# Patient Record
Sex: FEMALE | Race: BLACK OR AFRICAN AMERICAN | NOT HISPANIC OR LATINO | Employment: FULL TIME | URBAN - METROPOLITAN AREA
[De-identification: names, ages, dates, MRNs, and addresses within clinical notes are randomized per-mention and may not be internally consistent; named-entity substitution may affect disease eponyms.]

---

## 2019-08-13 PROBLEM — E55.9 VITAMIN D DEFICIENCY DISEASE: Status: ACTIVE | Noted: 2019-08-13

## 2019-08-13 PROBLEM — Z85.038 HISTORY OF COLON CANCER: Status: ACTIVE | Noted: 2018-06-18

## 2019-08-13 PROBLEM — M25.50 MULTIPLE JOINT PAIN: Status: ACTIVE | Noted: 2019-08-13

## 2019-08-13 PROBLEM — E53.8 FOLATE DEFICIENCY: Status: ACTIVE | Noted: 2018-06-21

## 2019-08-13 PROBLEM — R00.0 TACHYCARDIA: Status: ACTIVE | Noted: 2019-08-13

## 2019-08-13 PROBLEM — R53.83 FATIGUE: Status: ACTIVE | Noted: 2019-08-13

## 2020-11-12 ENCOUNTER — OFFICE VISIT (OUTPATIENT)
Dept: INTERNAL MEDICINE | Facility: CLINIC | Age: 55
End: 2020-11-12
Payer: COMMERCIAL

## 2020-11-12 ENCOUNTER — LAB VISIT (OUTPATIENT)
Dept: LAB | Facility: HOSPITAL | Age: 55
End: 2020-11-12
Payer: COMMERCIAL

## 2020-11-12 ENCOUNTER — HOSPITAL ENCOUNTER (OUTPATIENT)
Dept: RADIOLOGY | Facility: HOSPITAL | Age: 55
Discharge: HOME | End: 2020-11-12
Attending: FAMILY MEDICINE
Payer: COMMERCIAL

## 2020-11-12 VITALS
DIASTOLIC BLOOD PRESSURE: 80 MMHG | BODY MASS INDEX: 35.9 KG/M2 | TEMPERATURE: 100 F | SYSTOLIC BLOOD PRESSURE: 134 MMHG | HEART RATE: 68 BPM | OXYGEN SATURATION: 98 % | WEIGHT: 222.44 LBS

## 2020-11-12 DIAGNOSIS — M79.89 PAIN AND SWELLING OF LOWER LEG, LEFT: ICD-10-CM

## 2020-11-12 DIAGNOSIS — R53.82 CHRONIC FATIGUE: ICD-10-CM

## 2020-11-12 DIAGNOSIS — Z00.00 ROUTINE GENERAL MEDICAL EXAMINATION AT A HEALTH CARE FACILITY: ICD-10-CM

## 2020-11-12 DIAGNOSIS — M79.662 PAIN AND SWELLING OF LOWER LEG, LEFT: ICD-10-CM

## 2020-11-12 DIAGNOSIS — M25.50 PAIN IN JOINT, MULTIPLE SITES: ICD-10-CM

## 2020-11-12 DIAGNOSIS — Z12.31 ENCOUNTER FOR SCREENING MAMMOGRAM FOR MALIGNANT NEOPLASM OF BREAST: ICD-10-CM

## 2020-11-12 DIAGNOSIS — Z85.038 HISTORY OF COLON CANCER: ICD-10-CM

## 2020-11-12 DIAGNOSIS — M25.473 ANKLE SWELLING, UNSPECIFIED LATERALITY: ICD-10-CM

## 2020-11-12 DIAGNOSIS — Z00.00 ROUTINE GENERAL MEDICAL EXAMINATION AT A HEALTH CARE FACILITY: Primary | ICD-10-CM

## 2020-11-12 DIAGNOSIS — E66.01 SEVERE OBESITY (BMI 35.0-39.9) WITH COMORBIDITY: ICD-10-CM

## 2020-11-12 PROBLEM — E55.9 VITAMIN D DEFICIENCY DISEASE: Status: RESOLVED | Noted: 2019-08-13 | Resolved: 2020-11-12

## 2020-11-12 PROBLEM — R00.0 TACHYCARDIA: Status: RESOLVED | Noted: 2019-08-13 | Resolved: 2020-11-12

## 2020-11-12 LAB
BILIRUB UR QL STRIP: NEGATIVE
CLARITY UR: CLEAR
COLOR UR: YELLOW
GLUCOSE UR QL STRIP: NEGATIVE
HGB UR QL STRIP: ABNORMAL
KETONES UR QL STRIP: NEGATIVE
LEUKOCYTE ESTERASE UR QL STRIP: NEGATIVE
NITRITE UR QL STRIP: NEGATIVE
PH UR STRIP: 6 [PH] (ref 5–8)
PROT UR QL STRIP: NEGATIVE
SP GR UR STRIP: 1.02 (ref 1–1.03)
URN SPEC COLLECT METH UR: ABNORMAL

## 2020-11-12 PROCEDURE — 99999 PR PBB SHADOW E&M-NEW PATIENT-LVL III: ICD-10-PCS | Mod: PBBFAC,,, | Performed by: FAMILY MEDICINE

## 2020-11-12 PROCEDURE — 99213 PR OFFICE/OUTPT VISIT, EST, LEVL III, 20-29 MIN: ICD-10-PCS | Mod: 25,S$GLB,, | Performed by: FAMILY MEDICINE

## 2020-11-12 PROCEDURE — 3008F BODY MASS INDEX DOCD: CPT | Mod: CPTII,S$GLB,, | Performed by: FAMILY MEDICINE

## 2020-11-12 PROCEDURE — 3079F DIAST BP 80-89 MM HG: CPT | Mod: CPTII,S$GLB,, | Performed by: FAMILY MEDICINE

## 2020-11-12 PROCEDURE — 99396 PREV VISIT EST AGE 40-64: CPT | Mod: S$GLB,,, | Performed by: FAMILY MEDICINE

## 2020-11-12 PROCEDURE — 3075F SYST BP GE 130 - 139MM HG: CPT | Mod: CPTII,S$GLB,, | Performed by: FAMILY MEDICINE

## 2020-11-12 PROCEDURE — 81003 URINALYSIS AUTO W/O SCOPE: CPT

## 2020-11-12 PROCEDURE — 1126F PR PAIN SEVERITY QUANTIFIED, NO PAIN PRESENT: ICD-10-PCS | Mod: S$GLB,,, | Performed by: FAMILY MEDICINE

## 2020-11-12 PROCEDURE — 3075F PR MOST RECENT SYSTOLIC BLOOD PRESS GE 130-139MM HG: ICD-10-PCS | Mod: CPTII,S$GLB,, | Performed by: FAMILY MEDICINE

## 2020-11-12 PROCEDURE — 3008F PR BODY MASS INDEX (BMI) DOCUMENTED: ICD-10-PCS | Mod: CPTII,S$GLB,, | Performed by: FAMILY MEDICINE

## 2020-11-12 PROCEDURE — 99396 PR PREVENTIVE VISIT,EST,40-64: ICD-10-PCS | Mod: S$GLB,,, | Performed by: FAMILY MEDICINE

## 2020-11-12 PROCEDURE — 99213 OFFICE O/P EST LOW 20 MIN: CPT | Mod: 25,S$GLB,, | Performed by: FAMILY MEDICINE

## 2020-11-12 PROCEDURE — 73590 X-RAY EXAM OF LOWER LEG: CPT | Mod: TC,LT

## 2020-11-12 PROCEDURE — 3079F PR MOST RECENT DIASTOLIC BLOOD PRESSURE 80-89 MM HG: ICD-10-PCS | Mod: CPTII,S$GLB,, | Performed by: FAMILY MEDICINE

## 2020-11-12 PROCEDURE — 99999 PR PBB SHADOW E&M-NEW PATIENT-LVL III: CPT | Mod: PBBFAC,,, | Performed by: FAMILY MEDICINE

## 2020-11-12 PROCEDURE — 1126F AMNT PAIN NOTED NONE PRSNT: CPT | Mod: S$GLB,,, | Performed by: FAMILY MEDICINE

## 2020-11-12 PROCEDURE — 73590 XR TIBIA FIBULA 2 VIEW LEFT: ICD-10-PCS | Mod: 26,LT,, | Performed by: RADIOLOGY

## 2020-11-12 PROCEDURE — 73590 X-RAY EXAM OF LOWER LEG: CPT | Mod: 26,LT,, | Performed by: RADIOLOGY

## 2020-11-12 NOTE — PROGRESS NOTES
Subjective:       Patient ID: Sudhakar Singh is a 55 y.o. female.    Chief Complaint: Establish Care    55-year-old  female patient previously seen by Dr.Tasha Romano here to establish care.  Patient with Patient Active Problem List:     Folate deficiency     Acne vulgaris     History of colon cancer     Fatigue     Severe obesity (BMI 35.0-39.9) with comorbidity  Here for routine annual physicals.  Reports that she has not been taking blood pressure medication for more than a year, reports chronic fatigue  With history of colon cancer has been followed by Dr. Miranda , had colon surgery, chemo and radiation    Patient has not been exercising lately and reports that she has been having bilateral ankle swelling, recently at work had injury and reports having tenderness to the left lower leg just about the ankle for the past few days reports pain up to 5/10  Occasionally complains of minimal shortness of breath but not regularly  Patient also reports multiple joint pains which has been ongoing for past more than a year      Review of Systems   Constitutional: Positive for fatigue.   Eyes: Negative for visual disturbance.   Respiratory: Negative for shortness of breath.    Cardiovascular: Positive for leg swelling. Negative for chest pain.   Gastrointestinal: Negative for abdominal pain, blood in stool, constipation, nausea and vomiting.   Musculoskeletal: Positive for arthralgias, joint swelling and myalgias.   Skin: Negative for rash.   Neurological: Negative for weakness, light-headedness, numbness and headaches.   Psychiatric/Behavioral: Negative for sleep disturbance.         Review of Systems   Constitutional: Positive for fatigue.   Cardiovascular: Positive for leg swelling. Negative for chest pain.   Musculoskeletal: Positive for arthralgias, joint swelling and myalgias.   Skin: Negative for rash.   Neurological: Negative for weakness, light-headedness, numbness and headaches.     BP  134/80 (BP Location: Left arm, Patient Position: Sitting, BP Method: Large (Manual))   Pulse 68   Temp 99.5 °F (37.5 °C) (Tympanic)   Wt 100.9 kg (222 lb 7.1 oz)   SpO2 98%   BMI 35.90 kg/m²   Objective:      Physical Exam  Constitutional:       Appearance: She is well-developed.   HENT:      Head: Normocephalic and atraumatic.   Cardiovascular:      Rate and Rhythm: Normal rate and regular rhythm.      Heart sounds: Normal heart sounds. No murmur.   Pulmonary:      Effort: Pulmonary effort is normal.      Breath sounds: Normal breath sounds. No wheezing.   Abdominal:      General: Bowel sounds are normal.      Palpations: Abdomen is soft.      Tenderness: There is no abdominal tenderness.   Musculoskeletal:         General: Swelling and tenderness present.      Comments: Positive for bilateral ankle edema and swelling with a firm not noted to the left lower leg just about the left ankle   Skin:     General: Skin is warm and dry.      Findings: No rash.   Neurological:      Mental Status: She is alert and oriented to person, place, and time.   Psychiatric:         Mood and Affect: Mood normal.       Physical Exam  Constitutional:       Appearance: She is well-developed.   Cardiovascular:      Rate and Rhythm: Normal rate and regular rhythm.      Heart sounds: Normal heart sounds. No murmur.   Pulmonary:      Effort: Pulmonary effort is normal.      Breath sounds: Normal breath sounds. No wheezing.   Musculoskeletal:         General: Swelling and tenderness present.      Comments: Positive for bilateral ankle edema and swelling with a firm not noted to the left lower leg just about the left ankle   Skin:     General: Skin is warm and dry.      Findings: No rash.   Neurological:      Mental Status: She is alert and oriented to person, place, and time.       Assessment/Plan:   1. Routine general medical examination at a health care facility  - CBC Auto Differential; Future  - Comprehensive Metabolic Panel;  Future  - TSH; Future  - Lipid Panel; Future  - Hemoglobin A1C; Future  - Vitamin D; Future  - Urinalysis; Future  - HIV 1/2 Ag/Ab (4th Gen); Future  - Hepatitis C Antibody; Future  Vital signs stable today.  Clinical exam stable  Continue lifestyle modifications with low-fat and low-cholesterol diet and exercise 30 min daily  Flu shot given today    2. History of colon cancer  - CBC Auto Differential; Future  Followed by  Oncology Dr Fowler     3. Chronic fatigue  - TSH; Future  - Vitamin D; Future  Will check further labs but encouraged to eat protein rich diet    4. Encounter for screening mammogram for malignant neoplasm of breast  - Mammo Digital Screening Bilat w/ [facility]; Future  Due for mammogram    5. Severe obesity (BMI 35.0-39.9) with comorbidity  Strict lifestyle changes recommended with diet and exercise to lose weight with BMI 35    6. Pain and swelling of lower leg, left  - X-Ray Tibia Fibula 2 View Left; Future  Will get x-ray of the left lower leg to look into further etiology, secondary to recent injury    7. Ankle swelling, unspecified laterality  - BNP; Future  - MINA Screen w/Reflex; Future  Patient was advised to try compression stocking, will check further labs    8. Pain in joint, multiple sites  - MINA Screen w/Reflex; Future       Flu shot given today    Patient was advised to consider getting tetanus and shingles vaccination if interested outside pharmacy

## 2020-11-20 ENCOUNTER — HOSPITAL ENCOUNTER (OUTPATIENT)
Dept: RADIOLOGY | Facility: HOSPITAL | Age: 55
Discharge: HOME | End: 2020-11-20
Attending: FAMILY MEDICINE
Payer: COMMERCIAL

## 2020-11-20 VITALS — HEIGHT: 66 IN | WEIGHT: 222.44 LBS | BODY MASS INDEX: 35.75 KG/M2

## 2020-11-20 DIAGNOSIS — Z12.31 ENCOUNTER FOR SCREENING MAMMOGRAM FOR MALIGNANT NEOPLASM OF BREAST: ICD-10-CM

## 2020-11-20 PROCEDURE — 77067 MAMMO DIGITAL SCREENING BILAT WITH TOMO: ICD-10-PCS | Mod: 26,,, | Performed by: RADIOLOGY

## 2020-11-20 PROCEDURE — 77067 SCR MAMMO BI INCL CAD: CPT | Mod: TC

## 2020-11-20 PROCEDURE — 77063 BREAST TOMOSYNTHESIS BI: CPT | Mod: 26,,, | Performed by: RADIOLOGY

## 2020-11-20 PROCEDURE — 77067 SCR MAMMO BI INCL CAD: CPT | Mod: 26,,, | Performed by: RADIOLOGY

## 2020-11-20 PROCEDURE — 77063 MAMMO DIGITAL SCREENING BILAT WITH TOMO: ICD-10-PCS | Mod: 26,,, | Performed by: RADIOLOGY

## 2021-09-30 ENCOUNTER — OFFICE VISIT (OUTPATIENT)
Dept: FAMILY MEDICINE | Facility: CLINIC | Age: 56
End: 2021-09-30
Payer: COMMERCIAL

## 2021-09-30 VITALS
TEMPERATURE: 98 F | HEIGHT: 66 IN | OXYGEN SATURATION: 98 % | BODY MASS INDEX: 35.96 KG/M2 | SYSTOLIC BLOOD PRESSURE: 110 MMHG | HEART RATE: 70 BPM | RESPIRATION RATE: 18 BRPM | WEIGHT: 223.75 LBS | DIASTOLIC BLOOD PRESSURE: 80 MMHG

## 2021-09-30 DIAGNOSIS — M77.11 LATERAL EPICONDYLITIS OF RIGHT ELBOW: Primary | ICD-10-CM

## 2021-09-30 PROBLEM — R53.83 FATIGUE: Status: RESOLVED | Noted: 2019-08-13 | Resolved: 2021-09-30

## 2021-09-30 PROBLEM — C18.9 PRIMARY MALIGNANT NEOPLASM OF COLON: Status: ACTIVE | Noted: 2021-03-19

## 2021-09-30 PROBLEM — E78.5 HYPERLIPIDEMIA: Status: ACTIVE | Noted: 2021-03-19

## 2021-09-30 PROBLEM — I10 HYPERTENSION: Status: ACTIVE | Noted: 2021-03-19

## 2021-09-30 PROCEDURE — 3079F DIAST BP 80-89 MM HG: CPT | Mod: CPTII,S$GLB,, | Performed by: REGISTERED NURSE

## 2021-09-30 PROCEDURE — 99999 PR PBB SHADOW E&M-EST. PATIENT-LVL III: ICD-10-PCS | Mod: PBBFAC,,, | Performed by: REGISTERED NURSE

## 2021-09-30 PROCEDURE — 3008F BODY MASS INDEX DOCD: CPT | Mod: CPTII,S$GLB,, | Performed by: REGISTERED NURSE

## 2021-09-30 PROCEDURE — 1159F PR MEDICATION LIST DOCUMENTED IN MEDICAL RECORD: ICD-10-PCS | Mod: CPTII,S$GLB,, | Performed by: REGISTERED NURSE

## 2021-09-30 PROCEDURE — 1159F MED LIST DOCD IN RCRD: CPT | Mod: CPTII,S$GLB,, | Performed by: REGISTERED NURSE

## 2021-09-30 PROCEDURE — 3079F PR MOST RECENT DIASTOLIC BLOOD PRESSURE 80-89 MM HG: ICD-10-PCS | Mod: CPTII,S$GLB,, | Performed by: REGISTERED NURSE

## 2021-09-30 PROCEDURE — 99213 PR OFFICE/OUTPT VISIT, EST, LEVL III, 20-29 MIN: ICD-10-PCS | Mod: S$GLB,,, | Performed by: REGISTERED NURSE

## 2021-09-30 PROCEDURE — 3074F SYST BP LT 130 MM HG: CPT | Mod: CPTII,S$GLB,, | Performed by: REGISTERED NURSE

## 2021-09-30 PROCEDURE — 99213 OFFICE O/P EST LOW 20 MIN: CPT | Mod: S$GLB,,, | Performed by: REGISTERED NURSE

## 2021-09-30 PROCEDURE — 3008F PR BODY MASS INDEX (BMI) DOCUMENTED: ICD-10-PCS | Mod: CPTII,S$GLB,, | Performed by: REGISTERED NURSE

## 2021-09-30 PROCEDURE — 3074F PR MOST RECENT SYSTOLIC BLOOD PRESSURE < 130 MM HG: ICD-10-PCS | Mod: CPTII,S$GLB,, | Performed by: REGISTERED NURSE

## 2021-09-30 PROCEDURE — 99999 PR PBB SHADOW E&M-EST. PATIENT-LVL III: CPT | Mod: PBBFAC,,, | Performed by: REGISTERED NURSE

## 2021-09-30 RX ORDER — NAPROXEN 500 MG/1
500 TABLET ORAL 2 TIMES DAILY PRN
Qty: 14 TABLET | Refills: 0 | Status: SHIPPED | OUTPATIENT
Start: 2021-09-30

## 2021-11-02 ENCOUNTER — HOSPITAL ENCOUNTER (EMERGENCY)
Facility: HOSPITAL | Age: 56
Discharge: HOME | End: 2021-11-02
Attending: EMERGENCY MEDICINE
Payer: COMMERCIAL

## 2021-11-02 VITALS
SYSTOLIC BLOOD PRESSURE: 142 MMHG | TEMPERATURE: 99 F | HEIGHT: 66 IN | BODY MASS INDEX: 36.32 KG/M2 | RESPIRATION RATE: 16 BRPM | WEIGHT: 226 LBS | OXYGEN SATURATION: 99 % | DIASTOLIC BLOOD PRESSURE: 81 MMHG | HEART RATE: 65 BPM

## 2021-11-02 DIAGNOSIS — R10.12 LEFT UPPER QUADRANT ABDOMINAL PAIN: Primary | ICD-10-CM

## 2021-11-02 DIAGNOSIS — M54.50 ACUTE LEFT-SIDED LOW BACK PAIN WITHOUT SCIATICA: ICD-10-CM

## 2021-11-02 LAB
ALBUMIN SERPL BCP-MCNC: 3.6 G/DL (ref 3.5–5.2)
ALP SERPL-CCNC: 54 U/L (ref 55–135)
ALT SERPL W/O P-5'-P-CCNC: 14 U/L (ref 10–44)
ANION GAP SERPL CALC-SCNC: 9 MMOL/L (ref 8–16)
AST SERPL-CCNC: 22 U/L (ref 10–40)
BASOPHILS # BLD AUTO: 0.05 K/UL (ref 0–0.2)
BASOPHILS NFR BLD: 1 % (ref 0–1.9)
BILIRUB SERPL-MCNC: 0.5 MG/DL (ref 0.1–1)
BILIRUB UR QL STRIP: NEGATIVE
BUN SERPL-MCNC: 9 MG/DL (ref 6–20)
CALCIUM SERPL-MCNC: 9.2 MG/DL (ref 8.7–10.5)
CHLORIDE SERPL-SCNC: 106 MMOL/L (ref 95–110)
CLARITY UR: CLEAR
CO2 SERPL-SCNC: 23 MMOL/L (ref 23–29)
COLOR UR: YELLOW
CREAT SERPL-MCNC: 0.9 MG/DL (ref 0.5–1.4)
DIFFERENTIAL METHOD: ABNORMAL
EOSINOPHIL # BLD AUTO: 0.1 K/UL (ref 0–0.5)
EOSINOPHIL NFR BLD: 2.1 % (ref 0–8)
ERYTHROCYTE [DISTWIDTH] IN BLOOD BY AUTOMATED COUNT: 12.5 % (ref 11.5–14.5)
EST. GFR  (AFRICAN AMERICAN): >60 ML/MIN/1.73 M^2
EST. GFR  (NON AFRICAN AMERICAN): >60 ML/MIN/1.73 M^2
GLUCOSE SERPL-MCNC: 91 MG/DL (ref 70–110)
GLUCOSE UR QL STRIP: NEGATIVE
HCT VFR BLD AUTO: 36.6 % (ref 37–48.5)
HGB BLD-MCNC: 11.4 G/DL (ref 12–16)
HGB UR QL STRIP: ABNORMAL
IMM GRANULOCYTES # BLD AUTO: 0 K/UL (ref 0–0.04)
IMM GRANULOCYTES NFR BLD AUTO: 0 % (ref 0–0.5)
KETONES UR QL STRIP: NEGATIVE
LEUKOCYTE ESTERASE UR QL STRIP: NEGATIVE
LIPASE SERPL-CCNC: 5 U/L (ref 4–60)
LYMPHOCYTES # BLD AUTO: 2.5 K/UL (ref 1–4.8)
LYMPHOCYTES NFR BLD: 46.8 % (ref 18–48)
MCH RBC QN AUTO: 27.9 PG (ref 27–31)
MCHC RBC AUTO-ENTMCNC: 31.1 G/DL (ref 32–36)
MCV RBC AUTO: 90 FL (ref 82–98)
MONOCYTES # BLD AUTO: 0.4 K/UL (ref 0.3–1)
MONOCYTES NFR BLD: 7.6 % (ref 4–15)
NEUTROPHILS # BLD AUTO: 2.2 K/UL (ref 1.8–7.7)
NEUTROPHILS NFR BLD: 42.5 % (ref 38–73)
NITRITE UR QL STRIP: NEGATIVE
NRBC BLD-RTO: 0 /100 WBC
PH UR STRIP: 6 [PH] (ref 5–8)
PLATELET # BLD AUTO: 264 K/UL (ref 150–450)
PMV BLD AUTO: 10.6 FL (ref 9.2–12.9)
POTASSIUM SERPL-SCNC: 4.1 MMOL/L (ref 3.5–5.1)
PROT SERPL-MCNC: 7.5 G/DL (ref 6–8.4)
PROT UR QL STRIP: NEGATIVE
RBC # BLD AUTO: 4.09 M/UL (ref 4–5.4)
SODIUM SERPL-SCNC: 138 MMOL/L (ref 136–145)
SP GR UR STRIP: 1.02 (ref 1–1.03)
URN SPEC COLLECT METH UR: ABNORMAL
UROBILINOGEN UR STRIP-ACNC: NEGATIVE EU/DL
WBC # BLD AUTO: 5.23 K/UL (ref 3.9–12.7)

## 2021-11-02 PROCEDURE — 85025 COMPLETE CBC W/AUTO DIFF WBC: CPT | Performed by: NURSE PRACTITIONER

## 2021-11-02 PROCEDURE — 80053 COMPREHEN METABOLIC PANEL: CPT | Performed by: NURSE PRACTITIONER

## 2021-11-02 PROCEDURE — 99283 EMERGENCY DEPT VISIT LOW MDM: CPT | Mod: 25

## 2021-11-02 PROCEDURE — 81003 URINALYSIS AUTO W/O SCOPE: CPT | Performed by: NURSE PRACTITIONER

## 2021-11-02 PROCEDURE — 83690 ASSAY OF LIPASE: CPT | Performed by: NURSE PRACTITIONER

## 2021-11-02 RX ORDER — TRAMADOL HYDROCHLORIDE 50 MG/1
50 TABLET ORAL EVERY 6 HOURS PRN
Qty: 12 TABLET | Refills: 0 | Status: SHIPPED | OUTPATIENT
Start: 2021-11-02

## 2022-04-01 ENCOUNTER — NURSE TRIAGE (OUTPATIENT)
Dept: ADMINISTRATIVE | Facility: CLINIC | Age: 57
End: 2022-04-01
Payer: COMMERCIAL

## 2022-04-01 NOTE — TELEPHONE ENCOUNTER
Reason for Disposition   Patient wants to be seen    Additional Information   Negative: Sounds like a life-threatening emergency to the triager   Negative: Pregnant > 20 weeks or postpartum (< 6 weeks after delivery) and new hand or face swelling   Negative: Pregnant > 20 weeks and BP > 140/90   Negative: Systolic BP >= 160 OR Diastolic >= 100, and any cardiac or neurologic symptoms (e.g., chest pain, difficulty breathing, unsteady gait, blurred vision)   Negative: Patient sounds very sick or weak to the triager   Negative: BP Systolic BP >= 140 OR Diastolic >= 90 and postpartum (from 0 to 6 weeks after delivery)   Negative: Systolic BP >= 180 OR Diastolic >= 110, and missed most recent dose of blood pressure medication   Negative: Systolic BP >= 180 OR Diastolic >= 110    Protocols used: HIGH BLOOD PRESSURE-A-OH    Pt stated her bp has been high. Stated BP last night was 155/94 and BP today is 157/98. Stated she does not take BP meds. Pt showed up to the wrong location and missed her appointment this morning. Pt advised to see a MD in the office today. Instruced to go to Urgent care or the ED for evaluation. Advised to call OOC back if new or worsening symptoms develop. Pt verbalized understanding. Warm transferred to Joi in scheduling.

## 2022-08-11 ENCOUNTER — PATIENT OUTREACH (OUTPATIENT)
Dept: ADMINISTRATIVE | Facility: HOSPITAL | Age: 57
End: 2022-08-11
Payer: COMMERCIAL

## 2022-08-11 ENCOUNTER — TELEPHONE (OUTPATIENT)
Dept: INTERNAL MEDICINE | Facility: CLINIC | Age: 57
End: 2022-08-11
Payer: COMMERCIAL

## 2022-08-11 VITALS — SYSTOLIC BLOOD PRESSURE: 117 MMHG | DIASTOLIC BLOOD PRESSURE: 71 MMHG

## 2022-08-11 NOTE — PROGRESS NOTES
Working HTN report:     Called to discuss scheduling appointment and to get updated BP reading. Remote BP given 117/71, entered. Annual scheduled 10/12/2022

## 2022-08-24 DIAGNOSIS — Z12.31 OTHER SCREENING MAMMOGRAM: ICD-10-CM

## 2023-01-09 ENCOUNTER — OFFICE VISIT (OUTPATIENT)
Dept: OTOLARYNGOLOGY | Facility: CLINIC | Age: 58
End: 2023-01-09
Payer: COMMERCIAL

## 2023-01-09 VITALS — WEIGHT: 232.56 LBS | BODY MASS INDEX: 37.54 KG/M2 | TEMPERATURE: 98 F

## 2023-01-09 DIAGNOSIS — R42 DIZZINESS: Primary | ICD-10-CM

## 2023-01-09 PROCEDURE — 3008F BODY MASS INDEX DOCD: CPT | Mod: CPTII,S$GLB,, | Performed by: PHYSICIAN ASSISTANT

## 2023-01-09 PROCEDURE — 4010F ACE/ARB THERAPY RXD/TAKEN: CPT | Mod: CPTII,S$GLB,, | Performed by: PHYSICIAN ASSISTANT

## 2023-01-09 PROCEDURE — 4010F PR ACE/ARB THEARPY RXD/TAKEN: ICD-10-PCS | Mod: CPTII,S$GLB,, | Performed by: PHYSICIAN ASSISTANT

## 2023-01-09 PROCEDURE — 99203 PR OFFICE/OUTPT VISIT, NEW, LEVL III, 30-44 MIN: ICD-10-PCS | Mod: S$GLB,,, | Performed by: PHYSICIAN ASSISTANT

## 2023-01-09 PROCEDURE — 1159F PR MEDICATION LIST DOCUMENTED IN MEDICAL RECORD: ICD-10-PCS | Mod: CPTII,S$GLB,, | Performed by: PHYSICIAN ASSISTANT

## 2023-01-09 PROCEDURE — 99999 PR PBB SHADOW E&M-EST. PATIENT-LVL III: CPT | Mod: PBBFAC,,, | Performed by: PHYSICIAN ASSISTANT

## 2023-01-09 PROCEDURE — 99203 OFFICE O/P NEW LOW 30 MIN: CPT | Mod: S$GLB,,, | Performed by: PHYSICIAN ASSISTANT

## 2023-01-09 PROCEDURE — 3008F PR BODY MASS INDEX (BMI) DOCUMENTED: ICD-10-PCS | Mod: CPTII,S$GLB,, | Performed by: PHYSICIAN ASSISTANT

## 2023-01-09 PROCEDURE — 99999 PR PBB SHADOW E&M-EST. PATIENT-LVL III: ICD-10-PCS | Mod: PBBFAC,,, | Performed by: PHYSICIAN ASSISTANT

## 2023-01-09 PROCEDURE — 1159F MED LIST DOCD IN RCRD: CPT | Mod: CPTII,S$GLB,, | Performed by: PHYSICIAN ASSISTANT

## 2023-01-09 RX ORDER — HYDROCHLOROTHIAZIDE 12.5 MG/1
1 CAPSULE ORAL EVERY MORNING
COMMUNITY
End: 2023-10-30 | Stop reason: SDUPTHER

## 2023-01-09 RX ORDER — HYDROCODONE BITARTRATE AND ACETAMINOPHEN 5; 325 MG/1; MG/1
1 TABLET ORAL EVERY 6 HOURS PRN
COMMUNITY
Start: 2022-08-22 | End: 2023-10-30 | Stop reason: ALTCHOICE

## 2023-01-09 RX ORDER — LISINOPRIL 20 MG/1
20 TABLET ORAL
COMMUNITY
Start: 2022-11-29 | End: 2023-10-30 | Stop reason: SDUPTHER

## 2023-01-09 RX ORDER — ROSUVASTATIN CALCIUM 5 MG/1
5 TABLET, COATED ORAL
COMMUNITY
Start: 2022-12-13 | End: 2023-11-07 | Stop reason: SDUPTHER

## 2023-01-09 RX ORDER — CLONAZEPAM 0.5 MG/1
0.5 TABLET ORAL NIGHTLY
COMMUNITY
Start: 2022-08-22 | End: 2023-10-30 | Stop reason: ALTCHOICE

## 2023-01-09 RX ORDER — SERTRALINE HYDROCHLORIDE 25 MG/1
25 TABLET, FILM COATED ORAL
COMMUNITY
Start: 2022-08-22 | End: 2023-10-30 | Stop reason: ALTCHOICE

## 2023-01-09 RX ORDER — ONDANSETRON 4 MG/1
TABLET, ORALLY DISINTEGRATING ORAL
COMMUNITY
Start: 2022-08-09 | End: 2023-10-30 | Stop reason: ALTCHOICE

## 2023-01-09 RX ORDER — MECLIZINE HYDROCHLORIDE 25 MG/1
25 TABLET ORAL
COMMUNITY
Start: 2023-01-07 | End: 2023-10-30 | Stop reason: SDUPTHER

## 2023-01-09 NOTE — PROGRESS NOTES
Subjective:   Patient ID: Sudhakar Singh is a 57 y.o. female.    Chief Complaint: Other (ER follow up Vertigo )    Patient is a very pleasant 57 y.o. female here to see me today for the first time for evaluation of dizziness.   She reports that the symptoms have been present for the last 3 days.  On average, the patent reports symptoms that occur approximately 1 time each months.  She describes the dizziness as whirling, a sensation of unsteadiness, and a sensation of movement of surroundings and says that it lasts days.  She has noted that nothing acts as a trigger.  She denies aural pressure, otalgia, and hearing loss. She does have ringing in both ears, which is new.  She has not started any new medications, and has not had any recent dietary changes. She did take meclizine given from E yesterday. H/o subarachnoid hemorrhage in 8/22. She has associated N/V.     Review of patient's allergies indicates:  No Known Allergies        Review of Systems   Constitutional:  Negative for chills, fatigue, fever and unexpected weight change.   HENT:  Negative for congestion, dental problem, ear discharge, ear pain, facial swelling, hearing loss, nosebleeds, postnasal drip, rhinorrhea, sinus pressure, sneezing, sore throat, tinnitus, trouble swallowing and voice change.    Eyes:  Negative for redness, itching and visual disturbance.   Respiratory:  Negative for cough, choking, shortness of breath and wheezing.    Cardiovascular:  Negative for chest pain and palpitations.   Gastrointestinal:  Positive for nausea and vomiting. Negative for abdominal pain.        No reflux.   Musculoskeletal:  Negative for gait problem.   Skin:  Negative for rash.   Neurological:  Positive for dizziness. Negative for light-headedness and headaches.       Objective:   Temp 97.6 °F (36.4 °C) (Temporal)   Wt 105.5 kg (232 lb 9.4 oz)   BMI 37.54 kg/m²     Physical Exam  Constitutional:       General: She is not in acute distress.      Appearance: She is well-developed.   HENT:      Head: Normocephalic and atraumatic.      Right Ear: Tympanic membrane, ear canal and external ear normal.      Left Ear: Tympanic membrane, ear canal and external ear normal.      Nose: Nose normal. No nasal deformity, septal deviation, mucosal edema or rhinorrhea.      Right Sinus: No maxillary sinus tenderness or frontal sinus tenderness.      Left Sinus: No maxillary sinus tenderness or frontal sinus tenderness.      Mouth/Throat:      Mouth: Mucous membranes are not pale and not dry.      Dentition: No dental caries.      Pharynx: Uvula midline. No oropharyngeal exudate or posterior oropharyngeal erythema.   Eyes:      General: Lids are normal. No scleral icterus.     Extraocular Movements:      Right eye: Normal extraocular motion and no nystagmus.      Left eye: Normal extraocular motion and no nystagmus.      Conjunctiva/sclera: Conjunctivae normal.      Right eye: Right conjunctiva is not injected. No chemosis.     Left eye: Left conjunctiva is not injected. No chemosis.     Pupils: Pupils are equal, round, and reactive to light.   Neck:      Thyroid: No thyroid mass or thyromegaly.      Trachea: Trachea and phonation normal. No tracheal tenderness or tracheal deviation.   Pulmonary:      Effort: Pulmonary effort is normal. No respiratory distress.      Breath sounds: No stridor.   Abdominal:      General: There is no distension.   Lymphadenopathy:      Head:      Right side of head: No submental, submandibular, preauricular, posterior auricular or occipital adenopathy.      Left side of head: No submental, submandibular, preauricular, posterior auricular or occipital adenopathy.      Cervical: No cervical adenopathy.   Skin:     General: Skin is warm and dry.      Findings: No erythema or rash.   Neurological:      Mental Status: She is alert and oriented to person, place, and time.      Cranial Nerves: No cranial nerve deficit.   Psychiatric:          Behavior: Behavior normal.            Assessment:     1. Dizziness        Plan:     Dizziness    I had a long discussion with the patient regarding their symptoms.  Dizziness, vertigo and disequilibrium are common symptoms reported by adults during visits to their doctors. They are all symptoms that can result from a peripheral vestibular disorder (a dysfunction of the balance organs of the inner ear) or central vestibular disorder (a dysfunction of one or more parts of the central nervous system that help process balance and spatial information).  There are also non-vestibular causes of dizziness, and dizziness can be linked to a wide array of problems such as blood-flow irregularities from cardiovascular problems and blood pressure fluctuations.  I would recommend a VNG with audiogram for further diagnostic testing, and will contact the patient with further recommendations when that is complete.

## 2023-01-13 ENCOUNTER — CLINICAL SUPPORT (OUTPATIENT)
Dept: AUDIOLOGY | Facility: CLINIC | Age: 58
End: 2023-01-13
Payer: COMMERCIAL

## 2023-01-13 DIAGNOSIS — H81.8X9 OTHER DISORDERS OF VESTIBULAR FUNCTION, UNSPECIFIED EAR: Primary | ICD-10-CM

## 2023-01-13 PROCEDURE — 92540 PR VESTIBULAR EVAL NYSTAG FOVL&PERPH STIM OSCIL TRACKING: ICD-10-PCS | Mod: S$GLB,,, | Performed by: AUDIOLOGIST-HEARING AID FITTER

## 2023-01-13 PROCEDURE — 92557 PR COMPREHENSIVE HEARING TEST: ICD-10-PCS | Mod: S$GLB,,, | Performed by: AUDIOLOGIST-HEARING AID FITTER

## 2023-01-13 PROCEDURE — 92557 COMPREHENSIVE HEARING TEST: CPT | Mod: S$GLB,,, | Performed by: AUDIOLOGIST-HEARING AID FITTER

## 2023-01-13 PROCEDURE — 99999 PR PBB SHADOW E&M-EST. PATIENT-LVL I: ICD-10-PCS | Mod: PBBFAC,,, | Performed by: AUDIOLOGIST-HEARING AID FITTER

## 2023-01-13 PROCEDURE — 92540 BASIC VESTIBULAR EVALUATION: CPT | Mod: S$GLB,,, | Performed by: AUDIOLOGIST-HEARING AID FITTER

## 2023-01-13 PROCEDURE — 92567 TYMPANOMETRY: CPT | Mod: S$GLB,,, | Performed by: AUDIOLOGIST-HEARING AID FITTER

## 2023-01-13 PROCEDURE — 99999 PR PBB SHADOW E&M-EST. PATIENT-LVL I: CPT | Mod: PBBFAC,,, | Performed by: AUDIOLOGIST-HEARING AID FITTER

## 2023-01-13 PROCEDURE — 92567 PR TYMPA2METRY: ICD-10-PCS | Mod: S$GLB,,, | Performed by: AUDIOLOGIST-HEARING AID FITTER

## 2023-01-13 NOTE — PROGRESS NOTES
Referring provider: HENOK Lozoya Francisco was seen 01/13/2023 for an audiological and vestibular evaluation.  Patient complains of dizziness described as unsteadiness, swaying or rocking. Mostly when standing or walking but may occur when supine. At times, room may spin lasting for days. Symptoms persist off and on throughout the day. No specific provoking factors except for when arising from bed. The dizziness is worse when she arises after sleeping in bed, and is better when she arises after sleeping in her recliner. Patient saw a vestibular therapist at the ED on 01/06/23 and says she got very dizzy with positioning maneuvers. She denies hearing loss or changes in hearing. She will at times get brief moments of tinnitus, not new. She reports onset of migraine after diagnosis of subarachnoid hemorrhage in 8/22.     Audiology Report:  Results reveal normal hearing 250-6000 Hz sloping to a mild indeterminant hearing loss at 8000 Hz for the right ear, and normal hearing loss 250-8000 Hz for the left ear.   Speech Reception Thresholds were 15 dBHL for the right ear and 15 dBHL for the left ear.   Word recognition scores were excellent for the right ear and excellent for the left ear.   Tympanograms were Type A for the right ear and Type A for the left ear.    Videonystagmography Report (VNG):  Oculomotor function tests:  Sinusoidal tracking revealed reduced gain and catch-up saccade at all test frequencies.  Saccade revealed normal latencies, velocities and accuracies.  Optokinetic was abnormal with reduced (poor) function for rightward targest at 30 d/s. Patient had a difficult time maintaining eyes open.   Gaze test was absent for nystagmus.  Spontaneous test was absent for nystagmus.  Head-shake test was absent for after head-shake nystagmus.  Static Positional test was absent for nystagmus.  [place]-Hallpike Right revealed 7 d/s upbeat nystagmus with questionable presence of right-beat  nystagmus. Slight dizziness was reported.   [place]-Hallpike Left was questionable for BPPV. Patient would close eyes (dizziness) but it appeared as though there was torsional nystagmus. There was definite upbeat nystagmus, unknown SPV. Calorics could not be tested because patient ate a meal prior to exam.     Summary: VNG calorics could not be completed today because patient ate meal prior to exam. Abnormal function to sinusoidal pursuit and OPK are central oculo-motor findings. [place]-Hallpike was questionable for BPPV in at least the left ear versus non-localizing positional nystagmus. A Left Epley maneuver was completed today. No nystagmus or dizziness was elicited during Epley (goggles removed). Patient will return in one week for positioning recheck and calorics.     A 5-position Epley maneuver was performed for the left ear.  Patient tolerated the maneuver well and was asymptomatic upon discharge.  Post maneuver instructions were given to the patient both verbally and written.  Understanding was voiced.     Recommendations:  Head restrictions following Epley for one week.  Return in one week to recheck positionals and complete caloric test as needed.     Patient was counseled on the above findings.  Tracings are to be scanned.

## 2023-01-20 ENCOUNTER — CLINICAL SUPPORT (OUTPATIENT)
Dept: AUDIOLOGY | Facility: CLINIC | Age: 58
End: 2023-01-20
Payer: COMMERCIAL

## 2023-01-20 DIAGNOSIS — H81.4 VERTIGO OF CENTRAL ORIGIN, UNSPECIFIED LATERALITY: ICD-10-CM

## 2023-01-20 DIAGNOSIS — H81.92 PERIPHERAL VESTIBULOPATHY, LEFT: Primary | ICD-10-CM

## 2023-01-20 PROCEDURE — 92537 PR CALORIC VSTBLR TEST W/REC BITHERMAL: ICD-10-PCS | Mod: S$GLB,,, | Performed by: AUDIOLOGIST-HEARING AID FITTER

## 2023-01-20 PROCEDURE — 92537 CALORIC VSTBLR TEST W/REC: CPT | Mod: S$GLB,,, | Performed by: AUDIOLOGIST-HEARING AID FITTER

## 2023-01-20 PROCEDURE — 92542 POSITIONAL NYSTAGMUS TEST: CPT | Mod: S$GLB,,, | Performed by: AUDIOLOGIST-HEARING AID FITTER

## 2023-01-20 PROCEDURE — 92542 PR POSITIONAL NYSTAGMUS TEST: ICD-10-PCS | Mod: S$GLB,,, | Performed by: AUDIOLOGIST-HEARING AID FITTER

## 2023-01-20 NOTE — PROGRESS NOTES
"Referring provider: HENOK Lozoya Francisco was seen 2023 for completion of Videonystagmography (VNG) testing.    At her previous VNG visit on 23, results revealed abnormal sinusoidal pursuit and OPK. [place]-Hallpike was questionable for BPPV in at least the left ear versus non-localizing positional nystagmus. A left Epley maneuver was completed. Patient reports dizzy symptoms are slightly better.     Spontaneous nystagmus was absent.  [place]-Hallpike Left was negative for BPPV. There was questionable presence of mild up-beating nystagmus (non-measurable SPV, by observation only). Dizziness described as "head swirling" was reported.   [place]-Hallpike Right was negative for BPPV. There was 3 d/s RB positional nystagmus. Dizziness described as "head swirling" was reported.   Static Positional nystagmus was absent.  Bi-thermal caloric irrigations revealed a 35% caloric weakness in the left ear, which is not within normal limits, and 10% directional preponderance to the right, which is within normal limits.  Fixation suppression following caloric irrigations were normal.  RC: 14 d/s  RW: 13 d/s   d/s  LW: 4 d/s    Impressions: Abnormal VNG indicative of central and left peripheral vestibular dysfunction.   Abnormal findings were as follows:  35% left caloric weakness  Abnormal central oculo-motor sinusoidal pursuit and OPK    Recommendations:  1. ENT review  2. Formal VRT. Patient has not been able to resume work due to her dizziness.     Tracings are scanned into computer.     "

## 2023-01-25 DIAGNOSIS — R42 DIZZINESS: Primary | ICD-10-CM

## 2023-01-31 ENCOUNTER — CLINICAL SUPPORT (OUTPATIENT)
Dept: REHABILITATION | Facility: HOSPITAL | Age: 58
End: 2023-01-31
Payer: COMMERCIAL

## 2023-01-31 DIAGNOSIS — R42 DIZZINESS: ICD-10-CM

## 2023-01-31 PROCEDURE — 97112 NEUROMUSCULAR REEDUCATION: CPT | Mod: PN

## 2023-01-31 PROCEDURE — 97161 PT EVAL LOW COMPLEX 20 MIN: CPT | Mod: PN

## 2023-01-31 NOTE — PLAN OF CARE
[facility]   Physical Therapy Initial Evaluation     Date: 1/31/2023   Name: Sudhakar Singh  Clinic Number: [id number]    Therapy Diagnosis:   Encounter Diagnosis   Name Primary?    Dizziness      Physician: Gricelda Henderson,*    Physician Orders: PT Eval and Treat   Medical Diagnosis from Referral: Dizziness  Evaluation Date: 1/31/2023  Authorization Period Expiration: 12/31/2023  Plan of Care Expiration: 4/6/2023  Progress Note Due: 10th  Visit # / Visits authorized: 1/ 12   FOTO: 1/ 3     Precautions: Standard    Time In: 10:15  Time Out: 11:10  Total Appointment Time (timed & untimed codes): 55 minutes    SUBJECTIVE   Date of onset: 10 years     History of current condition - Sudhakar reports: 10 year history of dizziness  that typically last 3 days at a time. On average, the patent reports symptoms that occur approximately 1 time each month. She describes the dizziness as whirling, a sensation of unsteadiness, and a sensation of movement of surroundings and says that it lasts days. She typically leans or feels as she is falling toward the left.  At her previous VNG visit on 01/13/23, results revealed abnormal sinusoidal pursuit and OPK. [place]-Hallpike was questionable for BPPV in at least the left ear versus non-localizing positional nystagmus. A left Epley maneuver was completed. Patient reports dizzy symptoms are better since that visit.  Hx of colon cancer 7 years ago  H/o subarachnoid hemorrhage in 8/22    Falls: frequent LOB toward left but no falls    Imaging, MRI studies, VNG: Impressions: Abnormal VNG indicative of central and left peripheral vestibular dysfunction.   Abnormal findings were as follows:  35% left caloric weakness  Abnormal central oculo-motor sinusoidal pursuit and OPKH/o     MRI: subarachnoid hemorrhage in 8/22     Prior Therapy: yes  Social History:  lives alone  Occupation: currently on leave from [employer]  Prior Level of Function: mod I  Current  "Level of Function: mod I    Dizziness:  Current 1/10, worst 10/10, best 0/10   Location: left head  head  Description: whirling and dizzy  Aggravating Factors: Bending, Lifting, and Getting out of bed/chair  Easing Factors: relaxation    Patients goals: improve balance and start walking program     Medical History:   Past Medical History:   Diagnosis Date    Colon cancer     Dr Carpio    Hypertension 3/19/2021       Surgical History:   Sudhakar Singh  has a past surgical history that includes Hysterectomy and Colon surgery.    Medications:   Sudhakar has a current medication list which includes the following prescription(s): arm brace, clonazepam, hydrochlorothiazide, hydrocodone-acetaminophen, lisinopril, meclizine, naproxen, ondansetron, rosuvastatin, and sertraline.    Allergies:   Review of patient's allergies indicates:  No Known Allergies       OBJECTIVE         CMS Impairment/Limitation/Restriction for FOTO Vertigo Survey    Therapist reviewed FOTO scores for Sudhakar Singh on 1/31/2023.   FOTO documents entered into EPIC - see Media section.    Limitation Score: 53%  DFS: 42.7/67        Vestibular Testing  Visual Fields: normal  Horizontal tracking: slightly impaired with interruptions noted  Vertical Tracking: decreased speed  Diagonal tracking:  decreased speed and pt reports 3/10 dizziness post   Lateral Visual acuity: NT  Dynamic Visual Acuity: NT  Vestibular Occular Reflex   Vertical: normal   Horizontal: normal  Convergence: NT  [place] Halpike:  neg for nystagmus, pt did report whirling sensation and discomfort during supine to sit transition   Saccades: normal with 3/10 s/s reported     Vestibular:     Head impulse:  neg Nystagmus   Head shake:  neg nystagmus   Saud Hallpike  R neg, L neg   Roll test R neg, L neg, but uncomfortable "whirling" sensation    Balance testing:  Double limb standing eyes open/ eyes closed: 30"min sway/20" mod sway  NBOS: head nod- 3/10 dizziness    Head shake-3/10 " "  Eyes closed with head motion resulted in immediate LOB toward left  Floor tap: pt extremely guarded and apprehensive with bending forward or head down position  360 degree turn: unsteady and fearful toward left with greater than 5" to complete   normal less than 3" completion toward right   Side-lying to sit: increased dizziness report toward left     Fall prevention:   Increased fall risk with posterior, left, and forward pertubation with impaired ankle and hip strategy    TREATMENT     Total Treatment time (time-based codes) separate from Evaluation: 25 minutes      Sudhakar received the treatments listed below:    Seated and standing VOR, smooth pursuit, and habituation     PATIENT EDUCATION AND HOME EXERCISES     Education provided:   - yes, safety precautions for home and vestibular training     Written Home Exercises Provided: yes. Exercises were reviewed and Sudhakar was able to demonstrate them prior to the end of the session.  Sudhakar demonstrated good  understanding of the education provided. See EMR under Patient Instructions for exercises provided during therapy sessions.    ASSESSMENT     Sudhakar is a 57 y.o. female referred to outpatient Physical Therapy with a medical diagnosis of dizziness, pt presents with signs and symptoms consistent with diagnosis along with impaired dynamic balance. The patient presents with impairments which include weakness, impaired endurance, impaired functional mobility, gait instability, impaired balance, decreased safety awareness, decreased ROM, and VOR hypofunction .  These impairments are limiting patient's ability to complete ADLs, household chores, work related tasks, and community ambulation and levels.     Pt prognosis is Excellent due to personal factors and co-morbidities listed below.   Pt will benefit from skilled outpatient Physical Therapy to address the deficits stated above and in the chart below, provide pt/family education, and to maximize pt's level of " independence.     Plan of care discussed with patient: Yes  Pt's spiritual, cultural and educational needs considered and patient is agreeable to the plan of care and goals as stated below:     Anticipated Barriers for therapy: none    Medical Necessity is demonstrated by the following  History  Co-morbidities and personal factors that may impact the plan of care Co-morbidities:   See medical hx  Personal Factors:   no deficits     low   Examination  Body Structures and Functions, activity limitations and participation restrictions that may impact the plan of care Body Regions:   head    Body Systems:    strength  balance  gait  joint mobility, muscle tone, muscle length    Participation Restrictions:   See current level of function listed above     Activity limitations:   Learning and applying knowledge  no deficits    General Tasks and Commands  undertaking multiple tasks    Communication  no deficits    Mobility  walking    Self care  washing oneself (bathing, drying, washing hands)    Domestic Life  shopping  cooking  doing house work (cleaning house, washing dishes, laundry)    Interactions/Relationships  no deficits    Life Areas  employment    Community and Social Life  community life  recreation and leisure         low   Clinical Presentation stable and uncomplicated low   Decision Making/ Complexity Score: low     Goals: Reviewed:1/31/2023    STG'S: 6 weeks  1. Patient independent in HEP of balance, habituation, and gaze stabilization.  Exercises to be done Daily.  2. Decrease patient's subjective rating of dizziness to a 1 on 0-10 scale) with bed mobility and transfers.   3.  Decrease patient's subjective rating of dizziness to a  2 after bending forward to  item.     LTG'S : 10 weeks  1. Patient able to ambulate in community safely without assistive device, on varied terrainwith head movement, without LOB or c/o dizziness.  DGI improvement by 1-3 points.  2. Patient's subjective rating of dizziness  will decreased to 0-2 on 0-10 scale  3. Patient able to perform lifting, squatting, and light weight carrying without LOB or c/o dizziness  4.  Pt to ambulate outside on unlevel terrain without veering or feeling unsteadiness  5. Pt to be I with self management of condition and progression vestibular program for maintenance.    PLAN     Plan of care Certification: 1/31/2023 to 4/8/2023.    Outpatient Physical Therapy 2 times weekly for 10 weeks to include the following interventions: Electrical Stimulation prn, Gait Training, Manual Therapy, Moist Heat/ Ice, Neuromuscular Re-ed, Orthotic Management and Training, Patient Education, Self Care, Therapeutic Activities, Therapeutic Exercise, and canalith repositioning .     Karyna Antonio, PT      I CERTIFY THE NEED FOR THESE SERVICES FURNISHED UNDER THIS PLAN OF TREATMENT AND WHILE UNDER MY CARE   Physician's comments:     Physician's Signature: ___________________________________________________

## 2023-02-07 ENCOUNTER — TELEPHONE (OUTPATIENT)
Dept: OTOLARYNGOLOGY | Facility: CLINIC | Age: 58
End: 2023-02-07
Payer: COMMERCIAL

## 2023-02-07 NOTE — TELEPHONE ENCOUNTER
----- Message from Mer Khalil sent at 2/7/2023 11:26 AM CST -----  Pt would like the nurse to call her back regarding her last appt. Call back number is .[phone]. Thx. EL

## 2023-02-08 ENCOUNTER — CLINICAL SUPPORT (OUTPATIENT)
Dept: REHABILITATION | Facility: HOSPITAL | Age: 58
End: 2023-02-08
Payer: COMMERCIAL

## 2023-02-08 DIAGNOSIS — R42 DIZZINESS: Primary | ICD-10-CM

## 2023-02-08 PROCEDURE — 97112 NEUROMUSCULAR REEDUCATION: CPT | Mod: PN

## 2023-02-08 PROCEDURE — 97530 THERAPEUTIC ACTIVITIES: CPT | Mod: PN

## 2023-02-08 PROCEDURE — 97116 GAIT TRAINING THERAPY: CPT | Mod: PN

## 2023-02-08 NOTE — PROGRESS NOTES
"[facility]   Physical Therapy Treatment Note     Name: Sudhakar Singh  Clinic Number: [id number]    Therapy Diagnosis:   Encounter Diagnosis   Name Primary?    Dizziness Yes     Physician: Gricelda Henderson,*    Visit Date: 2/8/2023    Physician Orders: PT Eval and Treat   Medical Diagnosis from Referral: Dizziness  Evaluation Date: 1/31/2023  Authorization Period Expiration: 12/31/2023  Plan of Care Expiration: 4/6/2023  Progress Note Due: 10th  Visit # / Visits authorized: 2/ 12   FOTO: 1/ 3     PTA Visit #: 0/5     Time In: 9:00  Time Out: 9:55  Total Billable Time: 55 minutes    SUBJECTIVE     Pt reports: dizziness with large positional changes.  She was compliant with home exercise program.  Response to previous treatment: positive   Functional change: improved supine to sit t/f    Dizziness: 3/10  Location: bilateral head      OBJECTIVE     Objective Measures updated at progress report unless specified.     Treatment     Sudhakar received the treatments listed below:        neuromuscular re-education activities to improve: Balance, Coordination, Kinesthetic, Sense, Proprioception, Posture, and canalith repositioning for 40 minutes. The following activities were included:  VOR x1 training standing 30" 2x h/v max cues  Vertical head motion eyes open 10 reps CGA  Vertical head motion eyes closed 3 reps with LOB   Horiz head motion eyes open 10 reps CGA  Horiz head motion eyes closed 3 reps with LOB   STS from standard with arms 2x10 close range target and far with vast busy background  Reverse stepping 2 laps  Tandem walking  multi trials max of 3  DGI: 16/24  mCTSIB: 30", 30" increased sway, 30" mod sway, 16" lean to right  Head thrust: positive on right  DVA: 1 line loss  Saud Hallpike: upward rotating nyst Right side  Log Roll: no nystagmus noted, s/s with left    Epley Maneuver ([id number])  Indication: Dizziness, Positive [place]Hallpike Maneuver  Technique: After the procedure was " "explained at length to the patient and verbal consent was obtained, the patient's head was turned to the right while sitting upright. The patient was then brought to the supine position with head turned to the right and slightly extended, and remained in that position for 1 minute. The patient's head was then turned all the way to the left for 1 minute. At that point the patient was turned to left lateral decubitus position for 1 minute. The patient was then brought back to the upright seated position. The patient tolerated the procedure well, and was instructed to remain upright for the next 48 hours.       therapeutic activities to improve functional performance for 10  minutes, including:  Stair negotiation with recip pattern BUE use  Walking over 4" obstacle  Walking around 4" obstacles fwd and figure 8 pattern: increased LOB and unsteadiness with turning       gait training to improve functional mobility and safety for 10  minutes, including:  Amb with quick head turns  Amb with prolonged head turn  Amb with depth perception seeking  Amb with target seeking           Patient Education and Home Exercises     Home Exercises Provided and Patient Education Provided     Education provided:   - yes, VRT    Written Home Exercises Provided: yes. Exercises were reviewed and Sudhakar was able to demonstrate them prior to the end of the session.  Sudhakar demonstrated good  understanding of the education provided. See EMR under Patient Instructions for exercises provided during therapy sessions    ASSESSMENT     Pt tolerated session well. Pt presents with multi system deficits including right unilateral hypofunction noted with head thrust, upward rotating right nystagmus indicative of right posterior canal involvement, as well as saccadic interruption during smooth pursuit due to recent brain bleed. Her greatest deficits are noted with unlevel mandy and eyes closed as her vestibular system is impaired. Pt participated in " adaptation, habituation, and substitution during static and dynamic balance training.  She verbalized understanding to HEP and demonstrated with cues. Pt would benefit from cont VRT to reduce fall risk and improve functional independence.      Sudhakar Is progressing well towards her goals.   Pt prognosis is Good.     Pt will continue to benefit from skilled outpatient physical therapy to address the deficits listed in the problem list box on initial evaluation, provide pt/family education and to maximize pt's level of independence in the home and community environment.     Pt's spiritual, cultural and educational needs considered and pt agreeable to plan of care and goals.     Anticipated barriers to physical therapy: none    Goals:   STG'S: 6 weeks  1. Patient independent in HEP of balance, habituation, and gaze stabilization.  Exercises to be done Daily.  2. Decrease patient's subjective rating of dizziness to a 1 on 0-10 scale) with bed mobility and transfers.   3.  Decrease patient's subjective rating of dizziness to a  2 after bending forward to  item.      LTG'S  : 10 weeks  1. Patient able to ambulate in community safely without assistive device, on varied terrainwith head movement, without LOB or c/o dizziness.  DGI improvement by 1-3 points.  2. Patient's subjective rating of dizziness will decreased to 0-2 on 0-10 scale  3. Patient able to perform lifting, squatting, and light weight carrying without LOB or c/o dizziness  4.  Pt to ambulate outside on unlevel terrain without veering or feeling unsteadiness  5. Pt to be I with self management of condition and progression vestibular program for maintenance.     PLAN      Plan of care Certification: 1/31/2023 to 4/8/2023.     Outpatient Physical Therapy 2 times weekly for 10 weeks to include the following interventions: Electrical Stimulation prn, Gait Training, Manual Therapy, Moist Heat/ Ice, Neuromuscular Re-ed, Orthotic Management and Training,  Patient Education, Self Care, Therapeutic Activities, Therapeutic Exercise, and canalith repositioning .     Karyna Antonio, PT

## 2023-02-17 ENCOUNTER — CLINICAL SUPPORT (OUTPATIENT)
Dept: REHABILITATION | Facility: HOSPITAL | Age: 58
End: 2023-02-17
Payer: COMMERCIAL

## 2023-02-17 ENCOUNTER — TELEPHONE (OUTPATIENT)
Dept: OTOLARYNGOLOGY | Facility: CLINIC | Age: 58
End: 2023-02-17
Payer: COMMERCIAL

## 2023-02-17 DIAGNOSIS — R42 DIZZINESS: Primary | ICD-10-CM

## 2023-02-17 PROCEDURE — 97530 THERAPEUTIC ACTIVITIES: CPT | Mod: PN

## 2023-02-17 PROCEDURE — 97116 GAIT TRAINING THERAPY: CPT | Mod: PN

## 2023-02-17 PROCEDURE — 97112 NEUROMUSCULAR REEDUCATION: CPT | Mod: PN

## 2023-02-17 NOTE — PROGRESS NOTES
"[facility]   Physical Therapy Treatment Note     Name: Sudhakar Singh  Clinic Number: [id number]    Therapy Diagnosis:   Encounter Diagnosis   Name Primary?    Dizziness Yes     Physician: Gricelda Henderson,*    Visit Date: 2/17/2023    Physician Orders: PT Eval and Treat   Medical Diagnosis from Referral: Dizziness  Evaluation Date: 1/31/2023  Authorization Period Expiration: 12/31/2023  Plan of Care Expiration: 4/6/2023  Progress Note Due: 10th  Visit # / Visits authorized: 3/ 12   FOTO: 1/ 3     PTA Visit #: 0/5     Time In: 10:15  Time Out: 11:05  Total Billable Time: 50 minutes    SUBJECTIVE     Pt reports: felt a little unsteady but no dizziness  She was compliant with home exercise program.  Response to previous treatment: positive   Functional change: improved supine to sit t/f    Dizziness: 1/10  Location: bilateral head      OBJECTIVE     Objective Measures updated at progress report unless specified.     DGI: 16/24  Treatment     Sudhakar received the treatments listed below:        neuromuscular re-education activities to improve: Balance, Coordination, Kinesthetic, Sense, Proprioception, Posture, and canalith repositioning for 25 minutes. The following activities were included:  VOR x1 training standing 30" 2x h/v flat surface and foam   Vertical head motion eyes open 10 reps flat and foam surface  Vertical head motion eyes closed 1 LOB self corrected flat surface  Horiz head motion eyes open 10 reps CGA  Horiz head motion eyes closed 3 reps with LOB   Reverse stepping 2 laps with head turns  mCTSIB: 30", 30" increased sway, 30" mod sway, 16" lean to right  Head thrust: positive on right  DVA: 1 line loss  [place] Hallpike: upward rotating nyst Right side  Log Roll: no nystagmus noted, s/s with left          therapeutic activities to improve functional performance for 15 minutes, including:  D2 and D1 pattern holding 4# ball 1x10 ea side   Holding light weight ball " "reaching under chair and placing ball on OH shelf 1x10  STS from 20" box holding 10# ball with OH press 1x10  Floor taps with gaze stab to target 1x10 (2 LOB)      gait training to improve functional mobility and safety for 10  minutes, including:  Amb with quick head turns  Amb with prolonged head turn  Amb with depth perception seeking  Amb with target seeking       Patient Education and Home Exercises     Home Exercises Provided and Patient Education Provided     Education provided:   - yes, VRT    Written Home Exercises Provided: yes. Exercises were reviewed and Sudhakar was able to demonstrate them prior to the end of the session.  Sudhakar demonstrated good  understanding of the education provided. See EMR under Patient Instructions for exercises provided during therapy sessions    ASSESSMENT     Pt tolerated session well. She demonstrated improved balance with head rotation during ambulation and foam mandy. Advanced to work related tasks including squatting and lifting. Pt was unsteady and fatigued quickly but overall no dizziness reported. Increased difficulty noted along with posterior LOB with floor taps only.Pt to cont with current HEP with the addition of floor taps with target seeking at home. Pt would benefit from cont VRT to reduce fall risk and improve functional independence.      Sudhakar Is progressing well towards her goals.   Pt prognosis is Good.     Pt will continue to benefit from skilled outpatient physical therapy to address the deficits listed in the problem list box on initial evaluation, provide pt/family education and to maximize pt's level of independence in the home and community environment.     Pt's spiritual, cultural and educational needs considered and pt agreeable to plan of care and goals.     Anticipated barriers to physical therapy: none    Goals:   STG'S: 6 weeks  1. Patient independent in HEP of balance, habituation, and gaze stabilization.  Exercises to be done Daily.  2. " Decrease patient's subjective rating of dizziness to a 1 on 0-10 scale) with bed mobility and transfers.   3.  Decrease patient's subjective rating of dizziness to a  2 after bending forward to  item.      LTG'S  : 10 weeks  1. Patient able to ambulate in community safely without assistive device, on varied terrainwith head movement, without LOB or c/o dizziness.  DGI improvement by 1-3 points.  2. Patient's subjective rating of dizziness will decreased to 0-2 on 0-10 scale  3. Patient able to perform lifting, squatting, and light weight carrying without LOB or c/o dizziness  4.  Pt to ambulate outside on unlevel terrain without veering or feeling unsteadiness  5. Pt to be I with self management of condition and progression vestibular program for maintenance.     PLAN      Plan of care Certification: 1/31/2023 to 4/8/2023.     Outpatient Physical Therapy 2 times weekly for 10 weeks to include the following interventions: Electrical Stimulation prn, Gait Training, Manual Therapy, Moist Heat/ Ice, Neuromuscular Re-ed, Orthotic Management and Training, Patient Education, Self Care, Therapeutic Activities, Therapeutic Exercise, and canalith repositioning .     Karyna Antonio, PT

## 2023-02-17 NOTE — TELEPHONE ENCOUNTER
I sent a message to Karyna Antonio (T) to get job description and whether modifications are possible.  Asked that she reply to Karen and she can address when she's back in clinic next week.    ----- Message from Scooby Zheng LPN sent at 2/16/2023  4:47 PM CST -----  Contact: Sudhakar Ross!  This pt is requesting an extended leave of absence from work . She was originally under PCP care and off but then ended up with dizziness and now in therapy and would like to stay off of work until the 12 weeks is up. Please advise  ----- Message -----  From: Yi Watters  Sent: 2/16/2023  12:17 PM CST  To: Beatriz Madison Staff    Sudhakar is needing  a call back regarding paperwork. Please call her back at [phone].

## 2023-02-22 ENCOUNTER — TELEPHONE (OUTPATIENT)
Dept: OTOLARYNGOLOGY | Facility: CLINIC | Age: 58
End: 2023-02-22
Payer: COMMERCIAL

## 2023-02-22 NOTE — TELEPHONE ENCOUNTER
----- Message from Mango Perez sent at 2/22/2023 11:51 AM CST -----  Pt is calling in regards to the office needing to consult with her      It is in regards to insurance      Pls call her back at [phone]    Thank you    Nany

## 2023-02-23 ENCOUNTER — PATIENT MESSAGE (OUTPATIENT)
Dept: OTOLARYNGOLOGY | Facility: CLINIC | Age: 58
End: 2023-02-23
Payer: COMMERCIAL

## 2023-02-28 ENCOUNTER — TELEPHONE (OUTPATIENT)
Dept: OTOLARYNGOLOGY | Facility: CLINIC | Age: 58
End: 2023-02-28
Payer: COMMERCIAL

## 2023-02-28 NOTE — TELEPHONE ENCOUNTER
----- Message from Chintan Bledsoe sent at 2/28/2023  2:23 PM CST -----  Contact: Jamin manning  Type: Needs Medical Advice    Who Called:Jamin manning Disability Claim  Best Call Back Number:[phone] 9-5 central time  Additional Information Requesting a call back regarding Jamin manning was calling to speak with office in regards to some form provider recently filled for pt disability office stated they have some questions  please call back when available Thank you  Please Advise-Thank you

## 2023-03-01 ENCOUNTER — TELEPHONE (OUTPATIENT)
Dept: OTOLARYNGOLOGY | Facility: CLINIC | Age: 58
End: 2023-03-01
Payer: COMMERCIAL

## 2023-03-01 NOTE — TELEPHONE ENCOUNTER
Spoke with Kaitlin for [employer] claims. Stated she needed more info on work restrictions. Message sent to Karyna Romeo and Karen for advice.

## 2023-03-01 NOTE — TELEPHONE ENCOUNTER
----- Message from Susie Capellan sent at 3/1/2023  1:20 PM CST -----  Contact: [employer] depluca Madrigal is working on a claim that was submitted to their office and has some questions in regards to claims. Please call her back at [phone].          Thanks  DD

## 2023-03-03 ENCOUNTER — CLINICAL SUPPORT (OUTPATIENT)
Dept: REHABILITATION | Facility: HOSPITAL | Age: 58
End: 2023-03-03
Payer: COMMERCIAL

## 2023-03-03 DIAGNOSIS — R42 DIZZINESS: Primary | ICD-10-CM

## 2023-03-03 PROCEDURE — 97112 NEUROMUSCULAR REEDUCATION: CPT | Mod: PN

## 2023-03-03 PROCEDURE — 97116 GAIT TRAINING THERAPY: CPT | Mod: PN

## 2023-03-03 PROCEDURE — 97530 THERAPEUTIC ACTIVITIES: CPT | Mod: PN

## 2023-03-03 NOTE — PLAN OF CARE
[facility]  PT Discharge Note    Name: Sudhakar Singh  Clinic Number: [id number]    Therapy Diagnosis:   Encounter Diagnosis   Name Primary?    Dizziness Yes     Physician: Gricelda Henderson,*    Physician Orders: PT Eval and Treat   Medical Diagnosis from Referral: Dizziness  Evaluation Date: 1/31/2023    Date of Last visit: 3/3/2023  Total Visits Received: 4    ASSESSMENT      Pt reports feeling much better with no episodes of dizziness in the past two weeks. She demonstrated improved balance with head rotation during ambulation and foam mandy. Advanced to work related tasks including repetitive squatting and lifting. Multiple steps utilized to mimic work related scenarios with slower speed required for steadiness but overall pt did well with no LOB or dizziness.     Discharge reason: Patient has met all of his/her goals    Discharge FOTO Score: 30% limitation (improved from 52%)    Goals: Goals:   STG'S: 6 weeks  1. Patient independent in HEP of balance, habituation, and gaze stabilization.  Exercises to be done Daily. MET  2. Decrease patient's subjective rating of dizziness to a 1 on 0-10 scale) with bed mobility and transfers. MET  3.  Decrease patient's subjective rating of dizziness to a  2 after bending forward to  item. MET     LTG'S  : 10 weeks  1. Patient able to ambulate in community safely without assistive device, on varied terrainwith head movement, without LOB or c/o dizziness.  DGI improvement by 1-3 points. MET  2. Patient's subjective rating of dizziness will decreased to 0-2 on 0-10 scale. MET  3. Patient able to perform lifting, squatting, and light weight carrying without LOB or c/o dizziness. MET  4.  Pt to ambulate outside on unlevel terrain without veering or feeling unsteadiness. MET  5. Pt to be I with self management of condition and progression vestibular program for maintenance. MET    PLAN   This patient is discharged from Physical  Therapy      Karyna Antonio, PT

## 2023-03-03 NOTE — PROGRESS NOTES
"[facility]   Physical Therapy Treatment Note     Name: Sudhakar Singh  Clinic Number: [id number]    Therapy Diagnosis:   Encounter Diagnosis   Name Primary?    Dizziness Yes     Physician: Gricelda Henderson,*    Visit Date: 3/3/2023    Physician Orders: PT Eval and Treat   Medical Diagnosis from Referral: Dizziness  Evaluation Date: 1/31/2023  Authorization Period Expiration: 12/31/2023  Plan of Care Expiration: 4/6/2023  Progress Note Due: 10th  Visit # / Visits authorized: 3/ 12   FOTO: 1/ 3     PTA Visit #: 0/5     Time In: 10:15  Time Out: 11:05  Total Billable Time: 50 minutes    SUBJECTIVE     Pt reports: felt a little unsteady but no dizziness  She was compliant with home exercise program.  Response to previous treatment: positive   Functional change: improved supine to sit t/f    Dizziness: 1/10  Location: bilateral head      OBJECTIVE     Objective Measures updated at progress report unless specified.     DGI: 16/24  Treatment     Sudhakar received the treatments listed below:        neuromuscular re-education activities to improve: Balance, Coordination, Kinesthetic, Sense, Proprioception, Posture, and canalith repositioning for 15 minutes. The following activities were included:  VOR x1 training standing 30" 2x h/v flat surface and foam   Vertical head motion eyes open 10 reps flat and foam surface  Vertical head motion eyes closed 1 LOB self corrected flat surface  Horiz head motion eyes open 10 reps CGA  Horiz head motion eyes closed 3 reps with LOB          therapeutic activities to improve functional performance for 15 minutes, including:  D2 and D1 pattern holding 4# ball 1x10 ea side   Holding light weight ball reaching under chair and placing ball on OH shelf 1x10  STS from 20" box holding 10# ball with OH press 1x10  12 in to 18 in step with horiz and vertical head turns with pivot and step down to mimic work related tasks     gait training to improve functional " mobility and safety for 10  minutes, including:  Amb with quick head turns  Amb with prolonged head turn  Amb with depth perception seeking  Amb with target seeking       Patient Education and Home Exercises     Home Exercises Provided and Patient Education Provided     Education provided:   - yes, VRT    Written Home Exercises Provided: yes. Exercises were reviewed and Sudhakar was able to demonstrate them prior to the end of the session.  Sudhakar demonstrated good  understanding of the education provided. See EMR under Patient Instructions for exercises provided during therapy sessions    ASSESSMENT     Pt reports feeling much better with no episodes of dizziness in the past two weeks. She demonstrated improved balance with head rotation during ambulation and foam mandy. Advanced to work related tasks including repetitive squatting and lifting. Multiple steps utilized to mimic work related scenarios with slower speed required for steadiness but overall pt did well with no LOB or dizziness. At this time I see no evidence of BPPV and pt is ready to return to work. LPN for MD's office informed via [employer].     Sudhakar Is progressing well towards her goals.   Pt prognosis is Good.     Pt will continue to benefit from skilled outpatient physical therapy to address the deficits listed in the problem list box on initial evaluation, provide pt/family education and to maximize pt's level of independence in the home and community environment.     Pt's spiritual, cultural and educational needs considered and pt agreeable to plan of care and goals.     Anticipated barriers to physical therapy: none    Goals:   STG'S: 6 weeks  1. Patient independent in HEP of balance, habituation, and gaze stabilization.  Exercises to be done Daily. MET  2. Decrease patient's subjective rating of dizziness to a 1 on 0-10 scale) with bed mobility and transfers. MET  3.  Decrease patient's subjective rating of dizziness to a  2 after bending  forward to  item. MET     LTG'S  : 10 weeks  1. Patient able to ambulate in community safely without assistive device, on varied terrainwith head movement, without LOB or c/o dizziness.  DGI improvement by 1-3 points. MET  2. Patient's subjective rating of dizziness will decreased to 0-2 on 0-10 scale. MET  3. Patient able to perform lifting, squatting, and light weight carrying without LOB or c/o dizziness. MET  4.  Pt to ambulate outside on unlevel terrain without veering or feeling unsteadiness. MET  5. Pt to be I with self management of condition and progression vestibular program for maintenance. MET     PLAN      Plan of care Certification: 1/31/2023 to 4/8/2023.     Outpatient Physical Therapy 2 times weekly for 10 weeks to include the following interventions: Electrical Stimulation prn, Gait Training, Manual Therapy, Moist Heat/ Ice, Neuromuscular Re-ed, Orthotic Management and Training, Patient Education, Self Care, Therapeutic Activities, Therapeutic Exercise, and canalith repositioning .     Karyna Antonio, PT

## 2023-10-13 ENCOUNTER — TELEPHONE (OUTPATIENT)
Dept: PRIMARY CARE CLINIC | Facility: CLINIC | Age: 58
End: 2023-10-13
Payer: MEDICAID

## 2023-10-30 ENCOUNTER — OFFICE VISIT (OUTPATIENT)
Dept: PRIMARY CARE CLINIC | Facility: CLINIC | Age: 58
End: 2023-10-30
Payer: MEDICAID

## 2023-10-30 ENCOUNTER — PATIENT OUTREACH (OUTPATIENT)
Dept: ADMINISTRATIVE | Facility: OTHER | Age: 58
End: 2023-10-30
Payer: MEDICAID

## 2023-10-30 ENCOUNTER — HOSPITAL ENCOUNTER (OUTPATIENT)
Dept: CARDIOLOGY | Facility: HOSPITAL | Age: 58
Discharge: HOME | End: 2023-10-30
Attending: FAMILY MEDICINE
Payer: MEDICAID

## 2023-10-30 VITALS
WEIGHT: 237 LBS | BODY MASS INDEX: 38.09 KG/M2 | HEART RATE: 63 BPM | HEIGHT: 66 IN | DIASTOLIC BLOOD PRESSURE: 90 MMHG | SYSTOLIC BLOOD PRESSURE: 124 MMHG | OXYGEN SATURATION: 99 % | TEMPERATURE: 99 F

## 2023-10-30 DIAGNOSIS — Z85.038 HISTORY OF COLON CANCER: ICD-10-CM

## 2023-10-30 DIAGNOSIS — I10 ESSENTIAL HYPERTENSION: ICD-10-CM

## 2023-10-30 DIAGNOSIS — Z12.31 BREAST CANCER SCREENING BY MAMMOGRAM: ICD-10-CM

## 2023-10-30 DIAGNOSIS — Z11.3 SCREEN FOR STD (SEXUALLY TRANSMITTED DISEASE): ICD-10-CM

## 2023-10-30 DIAGNOSIS — E78.2 MIXED HYPERLIPIDEMIA: ICD-10-CM

## 2023-10-30 DIAGNOSIS — N30.00 ACUTE CYSTITIS WITHOUT HEMATURIA: ICD-10-CM

## 2023-10-30 DIAGNOSIS — R42 DIZZINESS AND GIDDINESS: Primary | ICD-10-CM

## 2023-10-30 DIAGNOSIS — R42 VERTIGO: ICD-10-CM

## 2023-10-30 LAB
BILIRUB UR QL STRIP: NEGATIVE
CLARITY UR REFRACT.AUTO: CLEAR
COLOR UR AUTO: YELLOW
GLUCOSE UR QL STRIP: NEGATIVE
HGB UR QL STRIP: NEGATIVE
KETONES UR QL STRIP: NEGATIVE
LEUKOCYTE ESTERASE UR QL STRIP: ABNORMAL
MICROSCOPIC COMMENT: NORMAL
NITRITE UR QL STRIP: NEGATIVE
PH UR STRIP: 6 [PH] (ref 5–8)
PROT UR QL STRIP: NEGATIVE
RBC #/AREA URNS AUTO: 3 /HPF (ref 0–4)
SP GR UR STRIP: 1.02 (ref 1–1.03)
SQUAMOUS #/AREA URNS AUTO: 5 /HPF
URN SPEC COLLECT METH UR: ABNORMAL
WBC #/AREA URNS AUTO: 3 /HPF (ref 0–5)

## 2023-10-30 PROCEDURE — 99215 PR OFFICE/OUTPT VISIT, EST, LEVL V, 40-54 MIN: ICD-10-PCS | Mod: S$PBB,,, | Performed by: FAMILY MEDICINE

## 2023-10-30 PROCEDURE — 99417 PR PROLONGED SVC, OUTPT, W/WO DIRECT PT CONTACT,  EA ADDTL 15 MIN: ICD-10-PCS | Mod: S$PBB,,, | Performed by: FAMILY MEDICINE

## 2023-10-30 PROCEDURE — 99417 PROLNG OP E/M EACH 15 MIN: CPT | Mod: S$PBB,,, | Performed by: FAMILY MEDICINE

## 2023-10-30 PROCEDURE — 1159F MED LIST DOCD IN RCRD: CPT | Mod: CPTII,,, | Performed by: FAMILY MEDICINE

## 2023-10-30 PROCEDURE — 99214 OFFICE O/P EST MOD 30 MIN: CPT | Mod: PBBFAC,PN | Performed by: FAMILY MEDICINE

## 2023-10-30 PROCEDURE — 3008F BODY MASS INDEX DOCD: CPT | Mod: CPTII,,, | Performed by: FAMILY MEDICINE

## 2023-10-30 PROCEDURE — 99999 PR PBB SHADOW E&M-EST. PATIENT-LVL IV: CPT | Mod: PBBFAC,,, | Performed by: FAMILY MEDICINE

## 2023-10-30 PROCEDURE — 4010F ACE/ARB THERAPY RXD/TAKEN: CPT | Mod: CPTII,,, | Performed by: FAMILY MEDICINE

## 2023-10-30 PROCEDURE — 4010F PR ACE/ARB THEARPY RXD/TAKEN: ICD-10-PCS | Mod: CPTII,,, | Performed by: FAMILY MEDICINE

## 2023-10-30 PROCEDURE — 3074F PR MOST RECENT SYSTOLIC BLOOD PRESSURE < 130 MM HG: ICD-10-PCS | Mod: CPTII,,, | Performed by: FAMILY MEDICINE

## 2023-10-30 PROCEDURE — 3008F PR BODY MASS INDEX (BMI) DOCUMENTED: ICD-10-PCS | Mod: CPTII,,, | Performed by: FAMILY MEDICINE

## 2023-10-30 PROCEDURE — 3044F HG A1C LEVEL LT 7.0%: CPT | Mod: CPTII,,, | Performed by: FAMILY MEDICINE

## 2023-10-30 PROCEDURE — 3080F DIAST BP >= 90 MM HG: CPT | Mod: CPTII,,, | Performed by: FAMILY MEDICINE

## 2023-10-30 PROCEDURE — 1159F PR MEDICATION LIST DOCUMENTED IN MEDICAL RECORD: ICD-10-PCS | Mod: CPTII,,, | Performed by: FAMILY MEDICINE

## 2023-10-30 PROCEDURE — 3044F PR MOST RECENT HEMOGLOBIN A1C LEVEL <7.0%: ICD-10-PCS | Mod: CPTII,,, | Performed by: FAMILY MEDICINE

## 2023-10-30 PROCEDURE — 3074F SYST BP LT 130 MM HG: CPT | Mod: CPTII,,, | Performed by: FAMILY MEDICINE

## 2023-10-30 PROCEDURE — 99215 OFFICE O/P EST HI 40 MIN: CPT | Mod: S$PBB,,, | Performed by: FAMILY MEDICINE

## 2023-10-30 PROCEDURE — 81001 URINALYSIS AUTO W/SCOPE: CPT | Performed by: FAMILY MEDICINE

## 2023-10-30 PROCEDURE — 99999 PR PBB SHADOW E&M-EST. PATIENT-LVL IV: ICD-10-PCS | Mod: PBBFAC,,, | Performed by: FAMILY MEDICINE

## 2023-10-30 PROCEDURE — 3080F PR MOST RECENT DIASTOLIC BLOOD PRESSURE >= 90 MM HG: ICD-10-PCS | Mod: CPTII,,, | Performed by: FAMILY MEDICINE

## 2023-10-30 RX ORDER — HYDROCHLOROTHIAZIDE 12.5 MG/1
12.5 CAPSULE ORAL EVERY MORNING
Qty: 90 CAPSULE | Refills: 3 | Status: SHIPPED | OUTPATIENT
Start: 2023-10-30 | End: 2024-10-29

## 2023-10-30 RX ORDER — LISINOPRIL 20 MG/1
20 TABLET ORAL DAILY
Qty: 90 TABLET | Refills: 3 | Status: SHIPPED | OUTPATIENT
Start: 2023-10-30 | End: 2024-10-29

## 2023-10-30 RX ORDER — MECLIZINE HYDROCHLORIDE 25 MG/1
25 TABLET ORAL 2 TIMES DAILY PRN
Qty: 60 TABLET | Refills: 1 | Status: SHIPPED | OUTPATIENT
Start: 2023-10-30 | End: 2024-01-28

## 2023-10-30 NOTE — PROGRESS NOTES
CHW - Initial Contact    This Community Health Worker completed the Social Determinant of Health questionnaire with patient during clinic visit today.    Pt identified barriers of most importance are none at this time.   Referrals to community agencies completed with patient consent outside of [facility] include: No outside referrals at this time.  Referrals were put through [facility] - no  Support and Services: None  Other information discussed the patient needs help with: No other information was discussed at this time.   Follow up required: No  No future outreach task assigned

## 2023-10-30 NOTE — PROGRESS NOTES
Subjective:       Patient ID: Sudhakar Singh is a 58 y.o. female.    Chief Complaint: Other Misc (Blurry/dizzy/new patient)      History of Present Illness:   Sudhakra Singh 58 y.o. female presents today with   HPI     Other Misc     Additional comments: Blurry/dizzy/new patient          Last edited by James Toth MA on 10/30/2023  9:35 AM.      New pt  2 days ago started having blurry vision, reports that BP was elevated, DBP was 124, unable to recall the SBP. Has not been on med for over a year, lost to follow up.  Asking to have increased dose from baseline. Also with left arm ache which started this morning. Has underlying vertigo, reports that this is diff, not having spinning experience.  Also reports UTI and asking to be tested.    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     Colon cancer     Dr Carpio    Hypertension 03/19/2021     Family History   Problem Relation Age of Onset    Diabetes Mother     Diabetes Father     Heart disease Father     Hypertension Father     Prostate cancer Father     Breast cancer Maternal Aunt      Social History     Socioeconomic History    Marital status:     Number of children: 4   Occupational History    Occupation: [employer]   Tobacco Use    Smoking status: Never    Smokeless tobacco: Never   Substance and Sexual Activity    Alcohol use: Yes    Drug use: Never    Sexual activity: Not Currently     Birth control/protection: See Surgical Hx     Social Determinants of Health     Financial Resource Strain: Low Risk  (10/30/2023)    Overall Financial Resource Strain (CARDIA)      Difficulty of Paying Living Expenses: Not hard at all   Food Insecurity: No Food Insecurity (10/30/2023)    Hunger Vital Sign     Worried About Running Out of Food in the Last Year: Never true     Ran Out of Food in the Last Year: Never true   Transportation Needs: No Transportation Needs (10/30/2023)    PRAPARE - Transportation     Lack of Transportation (Medical): No     Lack of Transportation (Non-Medical): No   Physical Activity: Insufficiently Active (10/30/2023)    Exercise Vital Sign     Days of Exercise per Week: 2 days     Minutes of Exercise per Session: 30 min   Stress: No Stress Concern Present (10/30/2023)    [place] [employer] - Occupational Stress Questionnaire     Feeling of Stress : Not at all   Social Connections: Moderately Isolated (10/30/2023)    Social Connection and Isolation Panel [NHANES]     Frequency of Communication with Friends and Family: More than three times a week     Frequency of Social Gatherings with Friends and Family: More than three times a week     Attends Islam Services: More than 4 times per year     Active Member of Clubs or Organizations: No     Attends Club or Organization Meetings: Never     Marital Status: Never    Housing Stability: Low Risk  (10/30/2023)    Housing Stability Vital Sign     Unable to Pay for Housing in the Last Year: No     Number of Places Lived in the Last Year: 1     Unstable Housing in the Last Year: No     Outpatient Encounter Medications as of 10/30/2023   Medication Sig Dispense Refill    hydroCHLOROthiazide (MICROZIDE) 12.5 mg capsule Take 1 capsule (12.5 mg total) by mouth every morning. 90 capsule 3    lisinopriL (PRINIVIL,ZESTRIL) 20 MG tablet Take 1 tablet (20 mg total) by mouth once daily. 90 tablet 3    meclizine (ANTIVERT) 25 mg tablet Take 1 tablet (25 mg total) by mouth 2 (two) times daily as needed for Dizziness. 60 tablet 1    naproxen (EC NAPROSYN) 500 MG EC tablet Take 1 tablet (500 mg total) by  "mouth 2 (two) times daily as needed (with food). (Patient not taking: Reported on 10/30/2023) 14 tablet 0    rosuvastatin (CRESTOR) 5 MG tablet Take 5 mg by mouth.      [DISCONTINUED] arm brace Misc TENNIS ELBOW BAND ---- USE AS DIRECTED (Patient not taking: Reported on 10/30/2023) 1 each 0    [DISCONTINUED] clonazePAM (KLONOPIN) 0.5 MG tablet Take 0.5 mg by mouth every evening.      [DISCONTINUED] hydroCHLOROthiazide (MICROZIDE) 12.5 mg capsule Take 1 capsule by mouth every morning.      [DISCONTINUED] HYDROcodone-acetaminophen (NORCO) 5-325 mg per tablet Take 1 tablet by mouth every 6 (six) hours as needed.      [DISCONTINUED] lisinopriL (PRINIVIL,ZESTRIL) 20 MG tablet Take 20 mg by mouth.      [DISCONTINUED] meclizine (ANTIVERT) 25 mg tablet Take 25 mg by mouth.      [DISCONTINUED] ondansetron (ZOFRAN-ODT) 4 MG TbDL       [DISCONTINUED] sertraline (ZOLOFT) 25 MG tablet Take 25 mg by mouth.       No facility-administered encounter medications on file as of 10/30/2023.       Review of Systems   Constitutional:  Negative for chills and fever.   HENT:  Negative for congestion and facial swelling.    Eyes:  Negative for discharge and itching.   Respiratory:  Negative for cough and wheezing.    Cardiovascular:  Negative for chest pain and palpitations.   Gastrointestinal:  Negative for abdominal pain, nausea and vomiting.   Endocrine: Negative for cold intolerance and heat intolerance.   Genitourinary:  Negative for dysuria and flank pain.   Musculoskeletal:  Positive for myalgias. Negative for neck stiffness.   Skin:  Negative for pallor and wound.   Neurological:  Negative for facial asymmetry and weakness.   Psychiatric/Behavioral:  Negative for agitation and suicidal ideas.        Objective:      BP (!) 124/90   Pulse 63   Temp 98.9 °F (37.2 °C)   Ht 5' 6" (1.676 m)   Wt 107.5 kg (237 lb)   SpO2 99%   BMI 38.25 kg/m²   Physical Exam  Vitals and nursing note reviewed.   Constitutional:       General: She is " not in acute distress.     Appearance: She is well-developed.   HENT:      Head: Normocephalic and atraumatic.      Right Ear: External ear normal.      Left Ear: External ear normal.   Eyes:      Conjunctiva/sclera: Conjunctivae normal.   Neck:      Thyroid: No thyromegaly.   Cardiovascular:      Rate and Rhythm: Normal rate and regular rhythm.      Pulses: Normal pulses.      Heart sounds: Normal heart sounds. No murmur heard.  Pulmonary:      Effort: Pulmonary effort is normal. No respiratory distress.      Breath sounds: Normal breath sounds.   Genitourinary:     Comments: deferred  Musculoskeletal:         General: No deformity.      Left shoulder: Tenderness (to the muscle) present. Decreased range of motion.      Cervical back: Neck supple.   Lymphadenopathy:      Head:      Right side of head: No submandibular adenopathy.      Left side of head: No submandibular adenopathy.      Cervical: No cervical adenopathy.   Skin:     General: Skin is warm and dry.   Neurological:      Mental Status: She is alert and oriented to person, place, and time.   Psychiatric:         Behavior: Behavior normal.         Results for orders placed or performed in visit on 11/12/20   Urinalysis   Result Value Ref Range    Specimen UA Urine, Clean Catch     Color, UA Yellow Yellow, Straw, Noelle    Appearance, UA Clear Clear    pH, UA 6.0 5.0 - 8.0    Specific Gravity, UA 1.025 1.005 - 1.030    Protein, UA Negative Negative    Glucose, UA Negative Negative    Ketones, UA Negative Negative    Bilirubin (UA) Negative Negative    Occult Blood UA Trace (A) Negative    Nitrite, UA Negative Negative    Leukocytes, UA Negative Negative     Assessment:       1. Dizziness and giddiness    2. Screen for STD (sexually transmitted disease)    3. Mixed hyperlipidemia    4. Essential hypertension    5. Vertigo    6. Acute cystitis without hematuria    7. History of colon cancer    8. Breast cancer screening by mammogram        Plan:   1. Dizziness  and giddiness  Comments:  new sxs, uncontrolled BP, needs further evaluation  Orders:  -     CT Head Without Contrast; Future; Expected date: 10/30/2023    2. Screen for STD (sexually transmitted disease)  -     RPR; Future; Expected date: 10/30/2023    3. Mixed hyperlipidemia  Overview:  The 10-year ASCVD risk score (Ata GARCIA, et al., 2019) is: 8%    Values used to calculate the score:      Age: 58 years      Sex: Female      Is Non- : Yes      Diabetic: No      Tobacco smoker: No      Systolic Blood Pressure: 124 mmHg      Is BP treated: Yes      HDL Cholesterol: 48 mg/dL      Total Cholesterol: 257 mg/dL     Cont statin    Orders:  -     Lipid Panel; Future; Expected date: 10/30/2023    4. Essential hypertension  Overview:  ch uncontrolled due to lost to follow up, neuro sxs, needs to rule out CVA event cont lisinopril    Orders:  -     Lipids Digital Medicine (LDMP) Enrollment Order  -     Lipids Digital Medicine (LDMP): Assign Onboarding Questionnaires  -     Lipid Panel; Future; Expected date: 10/30/2023  -     Comprehensive Metabolic Panel; Future; Expected date: 10/30/2023  -     CBC Auto Differential; Future; Expected date: 10/30/2023  -     lisinopriL (PRINIVIL,ZESTRIL) 20 MG tablet; Take 1 tablet (20 mg total) by mouth once daily.  Dispense: 90 tablet; Refill: 3  -     hydroCHLOROthiazide (MICROZIDE) 12.5 mg capsule; Take 1 capsule (12.5 mg total) by mouth every morning.  Dispense: 90 capsule; Refill: 3  -     EKG 12-lead; Future; Expected date: 10/30/2023  -     Hypertension Digital Medicine (HDMP) Enrollment Order  -     Hypertension Digital Medicine (HDMP): Assign Onboarding Questionnaires    5. Vertigo  Overview:  Acute on chronic  epley maneuver video shown to pt  Cont meclizine prn    Orders:  -     meclizine (ANTIVERT) 25 mg tablet; Take 1 tablet (25 mg total) by mouth 2 (two) times daily as needed for Dizziness.  Dispense: 60 tablet; Refill: 1    6. Acute cystitis without  hematuria  -     Urinalysis, Reflex to Urine Culture Urine, Clean Catch    7. History of colon cancer  Overview:  S/p surgery, rad and chemo      8. Breast cancer screening by mammogram  -     Mammo Digital Screening Bilat w/ Adama; Future; Expected date: 10/30/2023             MEDICAL DECISION MAKING: Moderate to high complexity.  Overall, the multiple problems listed are of moderate to high severity that may impact quality of life and activities of daily living. Side effects of medications, treatment plan as well as options and alternatives reviewed and discussed with patient. There was counseling of patient concerning these issues.  Total time spent in face to face counseling and coordination of care  is 65 minutes   I have reviewed all of the patient's clinical history available in care everywhere and Epic and have utilized this in my evaluation and management recommendations today.      Treatment options and alternatives were discussed with the patient. Patient was given ample time to ask questions. All questions were answered. Voices understanding and acceptance of this advice. Will call back if any further questions or concerns.                Jacquelin Liang MD  [facility],

## 2023-11-01 NOTE — PROGRESS NOTES
Your Urine test is equivocal and ensure that you are drinking enough fluid. If you anymore symptoms, return to clinic for repeat sampling, if no symptom, no need to repeat.

## 2023-11-07 DIAGNOSIS — E78.2 MIXED HYPERLIPIDEMIA: Primary | ICD-10-CM

## 2023-11-07 RX ORDER — ROSUVASTATIN CALCIUM 5 MG/1
5 TABLET, COATED ORAL NIGHTLY
Qty: 90 TABLET | Refills: 3 | Status: SHIPPED | OUTPATIENT
Start: 2023-11-07 | End: 2024-11-06

## 2023-11-09 ENCOUNTER — TELEPHONE (OUTPATIENT)
Dept: PRIMARY CARE CLINIC | Facility: CLINIC | Age: 58
End: 2023-11-09
Payer: MEDICAID

## 2023-11-15 ENCOUNTER — CLINICAL SUPPORT (OUTPATIENT)
Dept: PRIMARY CARE CLINIC | Facility: CLINIC | Age: 58
End: 2023-11-15
Payer: MEDICAID

## 2023-11-15 VITALS — DIASTOLIC BLOOD PRESSURE: 86 MMHG | SYSTOLIC BLOOD PRESSURE: 118 MMHG

## 2023-11-15 DIAGNOSIS — I10 ESSENTIAL HYPERTENSION: Primary | ICD-10-CM

## 2023-11-15 NOTE — PROGRESS NOTES
Patient presented for bp check. Bp: 118/86. Patient released and will follow up as scheduled. She voiced understanding.

## 2023-12-04 ENCOUNTER — HOSPITAL ENCOUNTER (OUTPATIENT)
Dept: RADIOLOGY | Facility: HOSPITAL | Age: 58
Discharge: HOME | End: 2023-12-04
Attending: FAMILY MEDICINE
Payer: MEDICAID

## 2023-12-04 DIAGNOSIS — R42 DIZZINESS AND GIDDINESS: ICD-10-CM

## 2023-12-04 PROCEDURE — 70450 CT HEAD/BRAIN W/O DYE: CPT | Mod: TC

## 2023-12-04 PROCEDURE — 70450 CT HEAD/BRAIN W/O DYE: CPT | Mod: 26,,, | Performed by: RADIOLOGY

## 2023-12-04 PROCEDURE — 70450 CT HEAD WITHOUT CONTRAST: ICD-10-PCS | Mod: 26,,, | Performed by: RADIOLOGY

## 2023-12-06 ENCOUNTER — HOSPITAL ENCOUNTER (OUTPATIENT)
Dept: RADIOLOGY | Facility: HOSPITAL | Age: 58
Discharge: HOME | End: 2023-12-06
Attending: FAMILY MEDICINE
Payer: MEDICAID

## 2023-12-06 DIAGNOSIS — Z12.31 BREAST CANCER SCREENING BY MAMMOGRAM: ICD-10-CM

## 2023-12-06 PROCEDURE — 77067 SCR MAMMO BI INCL CAD: CPT | Mod: 26,,, | Performed by: RADIOLOGY

## 2023-12-06 PROCEDURE — 77067 MAMMO DIGITAL SCREENING BILAT WITH TOMO: ICD-10-PCS | Mod: 26,,, | Performed by: RADIOLOGY

## 2023-12-06 PROCEDURE — 77063 BREAST TOMOSYNTHESIS BI: CPT | Mod: 26,,, | Performed by: RADIOLOGY

## 2023-12-06 PROCEDURE — 77063 MAMMO DIGITAL SCREENING BILAT WITH TOMO: ICD-10-PCS | Mod: 26,,, | Performed by: RADIOLOGY

## 2023-12-06 PROCEDURE — 77067 SCR MAMMO BI INCL CAD: CPT | Mod: TC

## 2023-12-07 NOTE — PROGRESS NOTES
Your CT is normal without any sign of stroke or tumor  Continue treatment as discussed during your visit and return to clinic in 2 weeks if your symptoms continue.

## 2023-12-22 ENCOUNTER — HOSPITAL ENCOUNTER (OUTPATIENT)
Dept: RADIOLOGY | Facility: HOSPITAL | Age: 58
Discharge: HOME | End: 2023-12-22
Attending: FAMILY MEDICINE
Payer: MEDICAID

## 2023-12-22 DIAGNOSIS — R92.8 ABNORMAL MAMMOGRAM: ICD-10-CM

## 2023-12-22 PROCEDURE — 76642 ULTRASOUND BREAST LIMITED: CPT | Mod: TC,LT

## 2023-12-22 PROCEDURE — 77061 MAMMO DIGITAL DIAGNOSTIC LEFT WITH TOMO: ICD-10-PCS | Mod: 26,LT,, | Performed by: RADIOLOGY

## 2023-12-22 PROCEDURE — 77065 DX MAMMO INCL CAD UNI: CPT | Mod: TC,LT

## 2023-12-22 PROCEDURE — 76642 US BREAST LEFT LIMITED: ICD-10-PCS | Mod: 26,LT,, | Performed by: RADIOLOGY

## 2023-12-22 PROCEDURE — 77065 MAMMO DIGITAL DIAGNOSTIC LEFT WITH TOMO: ICD-10-PCS | Mod: 26,LT,, | Performed by: RADIOLOGY

## 2023-12-22 PROCEDURE — 77065 DX MAMMO INCL CAD UNI: CPT | Mod: 26,LT,, | Performed by: RADIOLOGY

## 2023-12-22 PROCEDURE — 76642 ULTRASOUND BREAST LIMITED: CPT | Mod: 26,LT,, | Performed by: RADIOLOGY

## 2023-12-22 PROCEDURE — 77061 BREAST TOMOSYNTHESIS UNI: CPT | Mod: 26,LT,, | Performed by: RADIOLOGY

## 2023-12-23 NOTE — PROGRESS NOTES
Your diagnostic mammogram and ultrasound showed benign findings. Repeat mammogram in one year as recommended.

## 2024-01-16 ENCOUNTER — TELEPHONE (OUTPATIENT)
Dept: PRIMARY CARE CLINIC | Facility: CLINIC | Age: 59
End: 2024-01-16
Payer: MEDICAID

## 2024-01-16 NOTE — TELEPHONE ENCOUNTER
Called to inform the patient of his labs no answer LVM.         ----- Message from Jacquelin Liang MD sent at 12/6/2023  8:13 PM CST -----  Your CT is normal without any sign of stroke or tumor  Continue treatment as discussed during your visit and return to clinic in 2 weeks if your symptoms continue.

## 2024-01-17 ENCOUNTER — TELEPHONE (OUTPATIENT)
Dept: INTERNAL MEDICINE | Facility: CLINIC | Age: 59
End: 2024-01-17
Payer: MEDICAID

## 2024-01-17 NOTE — TELEPHONE ENCOUNTER
----- Message from Nuha Ruiz sent at 1/17/2024  4:27 PM CST -----  Pt is requesting a call back concerning new mammo orders call back when done. [phone]

## 2024-01-26 ENCOUNTER — TELEPHONE (OUTPATIENT)
Dept: PRIMARY CARE CLINIC | Facility: CLINIC | Age: 59
End: 2024-01-26
Payer: MEDICAID

## 2024-01-26 NOTE — TELEPHONE ENCOUNTER
----- Message from Rose Viramontes sent at 1/26/2024  1:43 PM CST -----  Contact: Pt [phone]  Patient is returning a phone call.  Who left a message for the patient: Marleni Tobar MA  Does patient know what this is regarding:  Mammo  Would you like a call back, or a response through your [facility] portal?:   Call back  Comments:     Patient is calling back concerning the mammogram results and a new order.    Please call and advise.    Thank You

## 2024-01-26 NOTE — TELEPHONE ENCOUNTER
Returned call to patient in regards to message. Informed pt she has mammo screening 10/30/23 she was then informed to have mammo diagnostic and US. Informed of results she voiced understanding. Also informed pt of CT results, she voiced understanding.

## 2024-04-12 ENCOUNTER — PATIENT MESSAGE (OUTPATIENT)
Dept: ADMINISTRATIVE | Facility: HOSPITAL | Age: 59
End: 2024-04-12
Payer: MEDICAID

## 2024-07-09 ENCOUNTER — PATIENT MESSAGE (OUTPATIENT)
Dept: ADMINISTRATIVE | Facility: HOSPITAL | Age: 59
End: 2024-07-09
Payer: MEDICAID

## 2024-08-12 ENCOUNTER — ON-DEMAND VIRTUAL (OUTPATIENT)
Dept: URGENT CARE | Facility: CLINIC | Age: 59
End: 2024-08-12
Payer: MEDICAID

## 2024-08-12 DIAGNOSIS — R42 DIZZINESS: Primary | ICD-10-CM

## 2024-08-12 DIAGNOSIS — R51.9 ACUTE INTRACTABLE HEADACHE, UNSPECIFIED HEADACHE TYPE: ICD-10-CM

## 2024-08-12 PROCEDURE — 99213 OFFICE O/P EST LOW 20 MIN: CPT | Mod: 95,,, | Performed by: NURSE PRACTITIONER

## 2024-08-12 NOTE — PROGRESS NOTES
Subjective:      Patient ID: Sudhakar Singh is a 59 y.o. female.    Vitals:  vitals were not taken for this visit.     Chief Complaint: Dizziness (Vertigo hx/Warmth in leg/Hx of tia/)      Visit Type: TELE AUDIOVISUAL    Present with the patient at the time of consultation: TELEMED PRESENT WITH PATIENT: None        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     Colon cancer     Dr Carpio    Hypertension 03/19/2021     Past Surgical History:   Procedure Laterality Date    COLON SURGERY      HYSTERECTOMY      partial     Review of patient's allergies indicates:  No Known Allergies  Current Outpatient Medications on File Prior to Visit   Medication Sig Dispense Refill    hydroCHLOROthiazide (MICROZIDE) 12.5 mg capsule Take 1 capsule (12.5 mg total) by mouth every morning. 90 capsule 3    lisinopriL (PRINIVIL,ZESTRIL) 20 MG tablet Take 1 tablet (20 mg total) by mouth once daily. 90 tablet 3    meclizine (ANTIVERT) 25 mg tablet Take 1 tablet (25 mg total) by mouth 2 (two) times daily as needed for Dizziness. 60 tablet 1    naproxen (EC NAPROSYN) 500 MG EC tablet Take 1 tablet (500 mg total) by mouth 2 (two) times daily as needed (with food). (Patient not taking: Reported on 10/30/2023) 14 tablet 0    rosuvastatin (CRESTOR) 5 MG tablet Take 1 tablet (5 mg total) by mouth every evening. 90 tablet 3     No current facility-administered medications on file prior to visit.     Family History   Problem Relation Name Age of Onset    Diabetes Mother      Diabetes Father      Heart disease Father      Hypertension Father      Prostate cancer Father      Breast cancer Maternal Aunt             Ohs Peq Odvv Intake    8/12/2024  5:56 PM CDT - Filed by Patient   What is your current physical address in the event of a medical emergency? [address]   Are you able to take your vital signs? Yes   Systolic Blood Pressure: 155   Diastolic Blood Pressure: 95   Weight: 230   Height:    Pulse: 66   Temperature: 97    Respiration rate:    Pulse Oxygen: 99   Please attach any relevant images or files          58 yo female with c/o dizziness and heat in leg every now and again. She states never happened before. She states a little pain in leg. She states she checked blood pressure and it was 155/95. She is not diabetic. She denies nausea. She states she has headaches and blurriness in eyes. She states she does suffer with vertigo per patient. She states it was not as bad as normally has because did get treatment for the vertigo. She states it occurred last week and it stayed on for about 5 days. She states a year ago she had a bleed in her brain. She states trying to get rid of headache and it is not worked. She has tried advil and takes pills for vertigo.         Constitution: Negative.   HENT: Negative.     Cardiovascular: Negative.    Respiratory: Negative.     Gastrointestinal: Negative.    Endocrine: negative.   Genitourinary: Negative.  Negative for frequency and urgency.   Musculoskeletal: Negative.    Skin: Negative.    Allergic/Immunologic: Negative.    Neurological:  Positive for dizziness.   Hematologic/Lymphatic: Negative.    Psychiatric/Behavioral: Negative.          Objective:   The physical exam was conducted virtually.  LOCATION OF PATIENT home  Physical Exam   Constitutional: She is oriented to person, place, and time. She appears well-developed.   HENT:   Head: Normocephalic and atraumatic.   Ears:   Right Ear: Hearing, tympanic membrane and external ear normal.   Left Ear: Hearing, tympanic membrane and external ear normal.   Nose: Nose normal.   Mouth/Throat: Uvula is midline, oropharynx is clear and moist and mucous membranes are normal.   Eyes: Conjunctivae and EOM are normal. Pupils are equal, round, and reactive to light.   Neck: Neck supple.   Cardiovascular: Normal rate.   Pulmonary/Chest: Effort normal and breath sounds normal.   Musculoskeletal: Normal range of motion.         General: Normal range of  motion.   Neurological: She is alert and oriented to person, place, and time.   Skin: Skin is warm.   Psychiatric: Her behavior is normal. Thought content normal.   Nursing note and vitals reviewed.      Assessment:     1. Dizziness    2. Acute intractable headache, unspecified headache type        Plan:     Recommend in person visit for further evaluation.   Dizziness    Acute intractable headache, unspecified headache type

## 2024-09-10 ENCOUNTER — OFFICE VISIT (OUTPATIENT)
Dept: PRIMARY CARE CLINIC | Facility: CLINIC | Age: 59
End: 2024-09-10
Payer: MEDICAID

## 2024-09-10 ENCOUNTER — LAB VISIT (OUTPATIENT)
Dept: LAB | Facility: HOSPITAL | Age: 59
End: 2024-09-10
Attending: NURSE PRACTITIONER
Payer: MEDICAID

## 2024-09-10 VITALS
BODY MASS INDEX: 39.48 KG/M2 | OXYGEN SATURATION: 97 % | HEIGHT: 66 IN | WEIGHT: 245.63 LBS | TEMPERATURE: 99 F | HEART RATE: 63 BPM | DIASTOLIC BLOOD PRESSURE: 92 MMHG | SYSTOLIC BLOOD PRESSURE: 130 MMHG

## 2024-09-10 DIAGNOSIS — I70.421 ATHEROSCLEROSIS OF AUTOLOGOUS VEIN BYPASS GRAFT OF RIGHT LOWER EXTREMITY WITH REST PAIN: ICD-10-CM

## 2024-09-10 DIAGNOSIS — I83.90 VARICOSE VEINS OF CALF: ICD-10-CM

## 2024-09-10 DIAGNOSIS — M79.89 PAIN AND SWELLING OF RIGHT LOWER EXTREMITY: ICD-10-CM

## 2024-09-10 DIAGNOSIS — I10 ESSENTIAL HYPERTENSION: ICD-10-CM

## 2024-09-10 DIAGNOSIS — K59.00 CONSTIPATION, UNSPECIFIED CONSTIPATION TYPE: ICD-10-CM

## 2024-09-10 DIAGNOSIS — Z11.4 SCREENING FOR HIV (HUMAN IMMUNODEFICIENCY VIRUS): ICD-10-CM

## 2024-09-10 DIAGNOSIS — Z13.6 ENCOUNTER FOR LIPID SCREENING FOR CARDIOVASCULAR DISEASE: ICD-10-CM

## 2024-09-10 DIAGNOSIS — Z11.59 NEED FOR HEPATITIS C SCREENING TEST: ICD-10-CM

## 2024-09-10 DIAGNOSIS — Z13.220 ENCOUNTER FOR LIPID SCREENING FOR CARDIOVASCULAR DISEASE: ICD-10-CM

## 2024-09-10 DIAGNOSIS — Z13.1 SCREENING FOR DIABETES MELLITUS: ICD-10-CM

## 2024-09-10 DIAGNOSIS — I10 ESSENTIAL HYPERTENSION: Primary | ICD-10-CM

## 2024-09-10 DIAGNOSIS — M79.604 PAIN AND SWELLING OF RIGHT LOWER EXTREMITY: ICD-10-CM

## 2024-09-10 DIAGNOSIS — Z00.00 GENERAL MEDICAL EXAM: ICD-10-CM

## 2024-09-10 DIAGNOSIS — E78.2 MIXED HYPERLIPIDEMIA: ICD-10-CM

## 2024-09-10 DIAGNOSIS — R35.0 FREQUENT URINATION: ICD-10-CM

## 2024-09-10 LAB
BASOPHILS # BLD AUTO: 0.06 K/UL (ref 0–0.2)
BASOPHILS NFR BLD: 1.1 % (ref 0–1.9)
BILIRUB SERPL-MCNC: NORMAL MG/DL
BLOOD, POC UA: NORMAL
DIFFERENTIAL METHOD BLD: ABNORMAL
EOSINOPHIL # BLD AUTO: 0.1 K/UL (ref 0–0.5)
EOSINOPHIL NFR BLD: 2.1 % (ref 0–8)
ERYTHROCYTE [DISTWIDTH] IN BLOOD BY AUTOMATED COUNT: 13.2 % (ref 11.5–14.5)
GLUCOSE UR QL STRIP: NORMAL
HCT VFR BLD AUTO: 39.5 % (ref 37–48.5)
HGB BLD-MCNC: 11.8 G/DL (ref 12–16)
IMM GRANULOCYTES # BLD AUTO: 0.01 K/UL (ref 0–0.04)
IMM GRANULOCYTES NFR BLD AUTO: 0.2 % (ref 0–0.5)
KETONES UR QL STRIP: 5
LEUKOCYTE ESTERASE URINE, POC: NORMAL
LYMPHOCYTES # BLD AUTO: 2.1 K/UL (ref 1–4.8)
LYMPHOCYTES NFR BLD: 39.3 % (ref 18–48)
MCH RBC QN AUTO: 27.6 PG (ref 27–31)
MCHC RBC AUTO-ENTMCNC: 29.9 G/DL (ref 32–36)
MCV RBC AUTO: 93 FL (ref 82–98)
MONOCYTES # BLD AUTO: 0.4 K/UL (ref 0.3–1)
MONOCYTES NFR BLD: 7.1 % (ref 4–15)
NEUTROPHILS # BLD AUTO: 2.7 K/UL (ref 1.8–7.7)
NEUTROPHILS NFR BLD: 50.2 % (ref 38–73)
NITRITE, POC UA: NORMAL
NRBC BLD-RTO: 0 /100 WBC
PH, POC UA: 7
PLATELET # BLD AUTO: 290 K/UL (ref 150–450)
PMV BLD AUTO: 11.8 FL (ref 9.2–12.9)
PROTEIN, POC: NORMAL
RBC # BLD AUTO: 4.27 M/UL (ref 4–5.4)
SPECIFIC GRAVITY, POC UA: 1.01
UROBILINOGEN, POC UA: 0.2
WBC # BLD AUTO: 5.35 K/UL (ref 3.9–12.7)

## 2024-09-10 PROCEDURE — 80053 COMPREHEN METABOLIC PANEL: CPT | Performed by: NURSE PRACTITIONER

## 2024-09-10 PROCEDURE — 3008F BODY MASS INDEX DOCD: CPT | Mod: CPTII,,, | Performed by: NURSE PRACTITIONER

## 2024-09-10 PROCEDURE — 85025 COMPLETE CBC W/AUTO DIFF WBC: CPT | Performed by: NURSE PRACTITIONER

## 2024-09-10 PROCEDURE — 99999PBSHW POCT URINALYSIS: Mod: PBBFAC,,,

## 2024-09-10 PROCEDURE — 99214 OFFICE O/P EST MOD 30 MIN: CPT | Mod: S$PBB,,, | Performed by: NURSE PRACTITIONER

## 2024-09-10 PROCEDURE — 86803 HEPATITIS C AB TEST: CPT | Performed by: NURSE PRACTITIONER

## 2024-09-10 PROCEDURE — 4010F ACE/ARB THERAPY RXD/TAKEN: CPT | Mod: CPTII,,, | Performed by: NURSE PRACTITIONER

## 2024-09-10 PROCEDURE — 99213 OFFICE O/P EST LOW 20 MIN: CPT | Mod: PBBFAC,PN | Performed by: NURSE PRACTITIONER

## 2024-09-10 PROCEDURE — 99999 PR PBB SHADOW E&M-EST. PATIENT-LVL III: CPT | Mod: PBBFAC,,, | Performed by: NURSE PRACTITIONER

## 2024-09-10 PROCEDURE — 3080F DIAST BP >= 90 MM HG: CPT | Mod: CPTII,,, | Performed by: NURSE PRACTITIONER

## 2024-09-10 PROCEDURE — 84443 ASSAY THYROID STIM HORMONE: CPT | Performed by: NURSE PRACTITIONER

## 2024-09-10 PROCEDURE — 36415 COLL VENOUS BLD VENIPUNCTURE: CPT | Mod: PN | Performed by: NURSE PRACTITIONER

## 2024-09-10 PROCEDURE — 81001 URINALYSIS AUTO W/SCOPE: CPT | Performed by: NURSE PRACTITIONER

## 2024-09-10 PROCEDURE — 3075F SYST BP GE 130 - 139MM HG: CPT | Mod: CPTII,,, | Performed by: NURSE PRACTITIONER

## 2024-09-10 PROCEDURE — 1160F RVW MEDS BY RX/DR IN RCRD: CPT | Mod: CPTII,,, | Performed by: NURSE PRACTITIONER

## 2024-09-10 PROCEDURE — 80061 LIPID PANEL: CPT | Performed by: NURSE PRACTITIONER

## 2024-09-10 PROCEDURE — 3044F HG A1C LEVEL LT 7.0%: CPT | Mod: CPTII,,, | Performed by: NURSE PRACTITIONER

## 2024-09-10 PROCEDURE — 1159F MED LIST DOCD IN RCRD: CPT | Mod: CPTII,,, | Performed by: NURSE PRACTITIONER

## 2024-09-10 PROCEDURE — 83036 HEMOGLOBIN GLYCOSYLATED A1C: CPT | Performed by: NURSE PRACTITIONER

## 2024-09-10 PROCEDURE — 87389 HIV-1 AG W/HIV-1&-2 AB AG IA: CPT | Performed by: NURSE PRACTITIONER

## 2024-09-10 PROCEDURE — 81003 URINALYSIS AUTO W/O SCOPE: CPT | Mod: PBBFAC,PN | Performed by: NURSE PRACTITIONER

## 2024-09-10 RX ORDER — ROSUVASTATIN CALCIUM 20 MG/1
20 TABLET, COATED ORAL DAILY
Qty: 90 TABLET | Refills: 3 | Status: SHIPPED | OUTPATIENT
Start: 2024-09-10 | End: 2025-09-10

## 2024-09-10 NOTE — PROGRESS NOTES
Subjective:       Patient ID: Sudhakar Singh is a 59 y.o. female.    Chief Complaint: Annual Exam and Urinary Frequency (X 2 weeks )      History of Present Illness:   Sudhakar Singh 59 y.o. female presents today for annual visit. She is also complaining of swelling of bilateral lower extremities for the past week stating that the right leg has increased in pain in the past few days. She admits pain is increases with movement. She admits warm to touch and swelling behind calf radiating to posterior knee. She also admits she has increased frequency of urination. She reports that she has not been taking her blood pressure medication daily  but takes it a few times throughout the week. She admits mild headaches occasionally. She is also concerned with with gradual increase of weight over the past few months. She reports she works out through out the week, and has tried to adjust her diet. No other concerns at this time.   HPI     Urinary Frequency     Additional comments: X 2 weeks           Last edited by Jackie Amin CMA on 9/10/2024  8:17 AM.      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     Colon cancer     Dr Carpio    Hypertension 03/19/2021     Family History   Problem Relation Name Age of Onset    Diabetes Mother      Diabetes Father      Heart disease Father      Hypertension Father      Prostate cancer Father      Breast cancer Maternal Aunt       Social History     Socioeconomic History    Marital status:     Number of children: 4   Occupational History    Occupation: [employer]   Tobacco Use    Smoking status: Never    Smokeless tobacco: Never   Substance and Sexual Activity    Alcohol use: Yes    Drug use: Never    Sexual activity: Not Currently     Birth control/protection: See Surgical Hx     Social Determinants of Health     Financial Resource Strain: Low Risk  (8/12/2024)    Overall Financial Resource Strain (CARDIA)     Difficulty of Paying Living Expenses:  Not hard at all   Food Insecurity: No Food Insecurity (8/12/2024)    Hunger Vital Sign     Worried About Running Out of Food in the Last Year: Never true     Ran Out of Food in the Last Year: Never true   Transportation Needs: No Transportation Needs (10/30/2023)    PRAPARE - Transportation     Lack of Transportation (Medical): No     Lack of Transportation (Non-Medical): No   Physical Activity: Insufficiently Active (8/12/2024)    Exercise Vital Sign     Days of Exercise per Week: 2 days     Minutes of Exercise per Session: 30 min   Stress: No Stress Concern Present (8/12/2024)    [employer] - Occupational Stress Questionnaire     Feeling of Stress : Only a little   Housing Stability: High Risk (8/12/2024)    Housing Stability Vital Sign     Unable to Pay for Housing in the Last Year: Yes     Outpatient Encounter Medications as of 9/10/2024   Medication Sig Dispense Refill    lisinopriL (PRINIVIL,ZESTRIL) 20 MG tablet Take 1 tablet (20 mg total) by mouth once daily. 90 tablet 3    meclizine (ANTIVERT) 25 mg tablet Take 1 tablet (25 mg total) by mouth 2 (two) times daily as needed for Dizziness. 60 tablet 1    rosuvastatin (CRESTOR) 5 MG tablet Take 1 tablet (5 mg total) by mouth every evening. 90 tablet 3    linaCLOtide (LINZESS) 72 mcg Cap capsule Take 1 capsule (72 mcg total) by mouth before breakfast. 30 capsule 1    rosuvastatin (CRESTOR) 20 MG tablet Take 1 tablet (20 mg total) by mouth once daily. 90 tablet 3    [DISCONTINUED] hydroCHLOROthiazide (MICROZIDE) 12.5 mg capsule Take 1 capsule (12.5 mg total) by mouth every morning. 90 capsule 3    [DISCONTINUED] naproxen (EC NAPROSYN) 500 MG EC tablet Take 1 tablet (500 mg total) by mouth 2 (two) times daily as needed (with food). (Patient not taking: Reported on 10/30/2023) 14 tablet 0     No facility-administered encounter medications on file as of 9/10/2024.       Review of Systems   Constitutional:  Positive for unexpected weight  "change.   HENT:  Negative for congestion and sinus pain.    Eyes:  Negative for visual disturbance.   Respiratory:  Negative for shortness of breath.    Cardiovascular:  Positive for leg swelling. Negative for chest pain and palpitations.   Endocrine: Negative for polyuria.   Genitourinary:  Positive for frequency and urgency. Negative for dysuria and hematuria.   Musculoskeletal:  Negative for arthralgias.   Neurological:  Positive for headaches. Negative for dizziness and weakness.   Psychiatric/Behavioral:  Negative for agitation.        Objective:      BP (!) 130/92   Pulse 63   Temp 98.7 °F (37.1 °C) (Oral)   Ht 5' 6" (1.676 m)   Wt 111.4 kg (245 lb 9.6 oz)   SpO2 97%   BMI 39.64 kg/m²   Physical Exam  Constitutional:       Appearance: Normal appearance. She is normal weight.   HENT:      Head: Normocephalic and atraumatic.      Right Ear: Tympanic membrane normal.      Left Ear: Tympanic membrane normal.      Nose: Nose normal.      Mouth/Throat:      Mouth: Mucous membranes are moist.      Pharynx: Oropharynx is clear.   Eyes:      Extraocular Movements: Extraocular movements intact.      Conjunctiva/sclera: Conjunctivae normal.      Pupils: Pupils are equal, round, and reactive to light.   Cardiovascular:      Rate and Rhythm: Normal rate and regular rhythm.      Pulses: Normal pulses.      Heart sounds: Normal heart sounds.   Pulmonary:      Effort: Pulmonary effort is normal.      Breath sounds: Normal breath sounds.   Abdominal:      General: Abdomen is flat. Bowel sounds are normal.      Palpations: Abdomen is soft.   Musculoskeletal:         General: Swelling present.      Cervical back: Normal range of motion.      Right lower leg: Swelling and tenderness present. Edema present.      Left lower leg: Swelling present. Edema present.        Legs:       Comments: Tenderness of right posterior calf radiating to knee   Skin:     General: Skin is warm and dry.   Neurological:      Mental Status: She is " alert and oriented to person, place, and time. Mental status is at baseline.   Psychiatric:         Mood and Affect: Mood normal.       Results for orders placed or performed in visit on 10/30/23   Lipid Panel   Result Value Ref Range    Cholesterol 257 (H) 120 - 199 mg/dL    Triglycerides 97 30 - 150 mg/dL    HDL 43 40 - 75 mg/dL    LDL Cholesterol 194.6 (H) 63.0 - 159.0 mg/dL    HDL/Cholesterol Ratio 16.7 (L) 20.0 - 50.0 %    Total Cholesterol/HDL Ratio 6.0 (H) 2.0 - 5.0    Non-HDL Cholesterol 214 mg/dL   RPR   Result Value Ref Range    RPR Non-reactive Non-reactive   Comprehensive Metabolic Panel   Result Value Ref Range    Sodium 139 136 - 145 mmol/L    Potassium 4.2 3.5 - 5.1 mmol/L    Chloride 106 95 - 110 mmol/L    CO2 22 (L) 23 - 29 mmol/L    Glucose 110 70 - 110 mg/dL    BUN 13 6 - 20 mg/dL    Creatinine 0.9 0.5 - 1.4 mg/dL    Calcium 9.4 8.7 - 10.5 mg/dL    Total Protein 7.3 6.0 - 8.4 g/dL    Albumin 3.5 3.5 - 5.2 g/dL    Total Bilirubin 0.4 0.1 - 1.0 mg/dL    Alkaline Phosphatase 54 (L) 55 - 135 U/L    AST 18 10 - 40 U/L    ALT 14 10 - 44 U/L    eGFR >60.0 >60 mL/min/1.73 m^2    Anion Gap 11 8 - 16 mmol/L   CBC Auto Differential   Result Value Ref Range    WBC 4.88 3.90 - 12.70 K/uL    RBC 4.30 4.00 - 5.40 M/uL    Hemoglobin 11.5 (L) 12.0 - 16.0 g/dL    Hematocrit 40.0 37.0 - 48.5 %    MCV 93 82 - 98 fL    MCH 26.7 (L) 27.0 - 31.0 pg    MCHC 28.8 (L) 32.0 - 36.0 g/dL    RDW 13.0 11.5 - 14.5 %    Platelets 262 150 - 450 K/uL    MPV 11.6 9.2 - 12.9 fL    Immature Granulocytes 0.2 0.0 - 0.5 %    Gran # (ANC) 2.5 1.8 - 7.7 K/uL    Immature Grans (Abs) 0.01 0.00 - 0.04 K/uL    Lymph # 1.9 1.0 - 4.8 K/uL    Mono # 0.3 0.3 - 1.0 K/uL    Eos # 0.1 0.0 - 0.5 K/uL    Baso # 0.05 0.00 - 0.20 K/uL    nRBC 0 0 /100 WBC    Gran % 51.4 38.0 - 73.0 %    Lymph % 38.3 18.0 - 48.0 %    Mono % 6.6 4.0 - 15.0 %    Eosinophil % 2.5 0.0 - 8.0 %    Basophil % 1.0 0.0 - 1.9 %    Differential Method Automated      Assessment:        1. Essential hypertension    2. Screening for diabetes mellitus    3. Encounter for lipid screening for cardiovascular disease    4. Need for hepatitis C screening test    5. Screening for HIV (human immunodeficiency virus)    6. Frequent urination    7. General medical exam    8. Mixed hyperlipidemia    9. 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 Pain and swelling of right lower extremity    11. Constipation, unspecified constipation type        Plan:   Essential hypertension  -     Comprehensive Metabolic Panel; Future; Expected date: 09/10/2024  -     Cancel: CBC Auto Differential; Future; Expected date: 09/10/2024  -     CBC Auto Differential; Future; Expected date: 09/10/2024    Screening for diabetes mellitus  -     Hemoglobin A1C; Future; Expected date: 09/10/2024    Encounter for lipid screening for cardiovascular disease  -     Lipid Panel; Future; Expected date: 09/10/2024    Need for hepatitis C screening test  -     Hepatitis C Antibody; Future; Expected date: 09/10/2024    Screening for HIV (human immunodeficiency virus)  -     HIV 1/2 Ag/Ab (4th Gen); Future; Expected date: 09/10/2024    Frequent urination  -     POCT URINALYSIS  -     Urinalysis Microscopic    General medical exam  -     TSH; Future; Expected date: 09/10/2024    Mixed hyperlipidemia  -     rosuvastatin (CRESTOR) 20 MG tablet; Take 1 tablet (20 mg total) by mouth once daily.  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  Dispense: 30 capsule; Refill: 1             [facility]   [address]  [place]  [place] [address]  Office [phone]  Fax [phone]

## 2024-09-11 LAB
ALBUMIN SERPL BCP-MCNC: 3.6 G/DL (ref 3.5–5.2)
ALP SERPL-CCNC: 60 U/L (ref 55–135)
ALT SERPL W/O P-5'-P-CCNC: 15 U/L (ref 10–44)
ANION GAP SERPL CALC-SCNC: 10 MMOL/L (ref 8–16)
AST SERPL-CCNC: 17 U/L (ref 10–40)
BACTERIA #/AREA URNS AUTO: NORMAL /HPF
BILIRUB SERPL-MCNC: 0.4 MG/DL (ref 0.1–1)
BUN SERPL-MCNC: 11 MG/DL (ref 6–20)
CALCIUM SERPL-MCNC: 9.2 MG/DL (ref 8.7–10.5)
CHLORIDE SERPL-SCNC: 108 MMOL/L (ref 95–110)
CHOLEST SERPL-MCNC: 248 MG/DL (ref 120–199)
CHOLEST/HDLC SERPL: 5.6 {RATIO} (ref 2–5)
CO2 SERPL-SCNC: 22 MMOL/L (ref 23–29)
CREAT SERPL-MCNC: 1 MG/DL (ref 0.5–1.4)
EST. GFR  (NO RACE VARIABLE): >60 ML/MIN/1.73 M^2
ESTIMATED AVG GLUCOSE: 108 MG/DL (ref 68–131)
GLUCOSE SERPL-MCNC: 111 MG/DL (ref 70–110)
HBA1C MFR BLD: 5.4 % (ref 4–5.6)
HCV AB SERPL QL IA: NORMAL
HDLC SERPL-MCNC: 44 MG/DL (ref 40–75)
HDLC SERPL: 17.7 % (ref 20–50)
HIV 1+2 AB+HIV1 P24 AG SERPL QL IA: NORMAL
LDLC SERPL CALC-MCNC: 178 MG/DL (ref 63–159)
MICROSCOPIC COMMENT: NORMAL
NONHDLC SERPL-MCNC: 204 MG/DL
POTASSIUM SERPL-SCNC: 4.2 MMOL/L (ref 3.5–5.1)
PROT SERPL-MCNC: 7.5 G/DL (ref 6–8.4)
RBC #/AREA URNS AUTO: 3 /HPF (ref 0–4)
SODIUM SERPL-SCNC: 140 MMOL/L (ref 136–145)
SQUAMOUS #/AREA URNS AUTO: 3 /HPF
TRIGL SERPL-MCNC: 130 MG/DL (ref 30–150)
TSH SERPL DL<=0.005 MIU/L-ACNC: 2.4 UIU/ML (ref 0.4–4)
WBC #/AREA URNS AUTO: 4 /HPF (ref 0–5)

## 2024-09-13 ENCOUNTER — HOSPITAL ENCOUNTER (OUTPATIENT)
Dept: CARDIOLOGY | Facility: HOSPITAL | Age: 59
Discharge: HOME | End: 2024-09-13
Attending: NURSE PRACTITIONER
Payer: MEDICAID

## 2024-09-13 VITALS
DIASTOLIC BLOOD PRESSURE: 59 MMHG | HEIGHT: 66 IN | SYSTOLIC BLOOD PRESSURE: 110 MMHG | WEIGHT: 245 LBS | BODY MASS INDEX: 39.37 KG/M2

## 2024-09-13 DIAGNOSIS — M79.604 PAIN AND SWELLING OF RIGHT LOWER EXTREMITY: ICD-10-CM

## 2024-09-13 DIAGNOSIS — M79.89 PAIN AND SWELLING OF RIGHT LOWER EXTREMITY: ICD-10-CM

## 2024-09-13 PROCEDURE — 93971 EXTREMITY STUDY: CPT | Mod: RT

## 2024-09-13 PROCEDURE — 93971 EXTREMITY STUDY: CPT | Mod: 26,RT,, | Performed by: INTERNAL MEDICINE

## 2024-09-16 DIAGNOSIS — M71.21 SYNOVIAL CYST OF RIGHT KNEE: Primary | ICD-10-CM

## 2024-09-19 ENCOUNTER — TELEPHONE (OUTPATIENT)
Dept: PRIMARY CARE CLINIC | Facility: CLINIC | Age: 59
End: 2024-09-19
Payer: MEDICAID

## 2024-09-19 NOTE — TELEPHONE ENCOUNTER
Pt viewed results via [employer]. Called to inform pt of results to see if pt had an concerns or question no answer.    ----- Message from Artis Campbell NP sent at 9/16/2024 10:37 AM CDT -----  he ultrasound does not show a dvt. The swelling and pain is related to a cyst behind the knee. I will place a referral for orthopedics

## 2024-10-03 ENCOUNTER — TELEPHONE (OUTPATIENT)
Dept: OTOLARYNGOLOGY | Facility: CLINIC | Age: 59
End: 2024-10-03
Payer: MEDICAID

## 2024-10-03 NOTE — TELEPHONE ENCOUNTER
----- Message from Charles sent at 10/3/2024  3:08 PM CDT -----  Contact: [phone]  Patient needs first available appointment due to possible sinus infection. Please call patient at [phone]. Thanks KB

## 2024-10-03 NOTE — TELEPHONE ENCOUNTER
Call placed to patient in regards to scheduling an appointment. I advised patient to keep up coming appointment. I informed her the next available appointment was march. Patient stated she would keep upcoming appointment.

## 2024-10-15 ENCOUNTER — TELEPHONE (OUTPATIENT)
Dept: PRIMARY CARE CLINIC | Facility: CLINIC | Age: 59
End: 2024-10-15
Payer: MEDICAID

## 2024-10-15 NOTE — TELEPHONE ENCOUNTER
Called to inform pt of labs, pt voiced understanding.     ----- Message from Artis Campbell NP sent at 9/16/2024 10:37 AM CDT -----  he ultrasound does not show a dvt. The swelling and pain is related to a cyst behind the knee. I will place a referral for orthopedics

## 2024-10-16 ENCOUNTER — PATIENT MESSAGE (OUTPATIENT)
Dept: ADMINISTRATIVE | Facility: HOSPITAL | Age: 59
End: 2024-10-16
Payer: MEDICAID

## 2024-10-30 ENCOUNTER — OFFICE VISIT (OUTPATIENT)
Dept: OTOLARYNGOLOGY | Facility: CLINIC | Age: 59
End: 2024-10-30
Payer: MEDICAID

## 2024-10-30 VITALS — HEIGHT: 66 IN | BODY MASS INDEX: 39.62 KG/M2 | WEIGHT: 246.5 LBS

## 2024-10-30 DIAGNOSIS — J30.9 ALLERGIC RHINITIS, UNSPECIFIED SEASONALITY, UNSPECIFIED TRIGGER: ICD-10-CM

## 2024-10-30 DIAGNOSIS — H61.21 IMPACTED CERUMEN OF RIGHT EAR: Primary | ICD-10-CM

## 2024-10-30 DIAGNOSIS — R42 DIZZINESS: ICD-10-CM

## 2024-10-30 PROCEDURE — 4010F ACE/ARB THERAPY RXD/TAKEN: CPT | Mod: CPTII,,, | Performed by: PHYSICIAN ASSISTANT

## 2024-10-30 PROCEDURE — 69210 REMOVE IMPACTED EAR WAX UNI: CPT | Mod: S$PBB,,, | Performed by: PHYSICIAN ASSISTANT

## 2024-10-30 PROCEDURE — 3044F HG A1C LEVEL LT 7.0%: CPT | Mod: CPTII,,, | Performed by: PHYSICIAN ASSISTANT

## 2024-10-30 PROCEDURE — 3008F BODY MASS INDEX DOCD: CPT | Mod: CPTII,,, | Performed by: PHYSICIAN ASSISTANT

## 2024-10-30 PROCEDURE — 99999 PR PBB SHADOW E&M-EST. PATIENT-LVL II: CPT | Mod: PBBFAC,,, | Performed by: PHYSICIAN ASSISTANT

## 2024-10-30 PROCEDURE — 99212 OFFICE O/P EST SF 10 MIN: CPT | Mod: PBBFAC,25 | Performed by: PHYSICIAN ASSISTANT

## 2024-10-30 PROCEDURE — 99213 OFFICE O/P EST LOW 20 MIN: CPT | Mod: 25,S$PBB,, | Performed by: PHYSICIAN ASSISTANT

## 2024-10-30 PROCEDURE — 69210 REMOVE IMPACTED EAR WAX UNI: CPT | Mod: PBBFAC | Performed by: PHYSICIAN ASSISTANT

## 2024-10-30 PROCEDURE — 1159F MED LIST DOCD IN RCRD: CPT | Mod: CPTII,,, | Performed by: PHYSICIAN ASSISTANT

## 2024-10-30 RX ORDER — FLUTICASONE PROPIONATE 50 MCG
2 SPRAY, SUSPENSION (ML) NASAL DAILY
Qty: 16 G | Refills: 11 | Status: SHIPPED | OUTPATIENT
Start: 2024-10-30 | End: 2025-10-30

## 2024-11-13 ENCOUNTER — PATIENT MESSAGE (OUTPATIENT)
Dept: ADMINISTRATIVE | Facility: HOSPITAL | Age: 59
End: 2024-11-13
Payer: MEDICAID

## 2024-11-21 ENCOUNTER — PATIENT MESSAGE (OUTPATIENT)
Dept: ADMINISTRATIVE | Facility: HOSPITAL | Age: 59
End: 2024-11-21
Payer: MEDICAID

## 2024-11-22 ENCOUNTER — TELEPHONE (OUTPATIENT)
Dept: PRIMARY CARE CLINIC | Facility: CLINIC | Age: 59
End: 2024-11-22
Payer: MEDICAID

## 2025-01-02 DIAGNOSIS — Z12.31 OTHER SCREENING MAMMOGRAM: ICD-10-CM

## 2025-01-08 ENCOUNTER — PATIENT MESSAGE (OUTPATIENT)
Dept: ADMINISTRATIVE | Facility: HOSPITAL | Age: 60
End: 2025-01-08
Payer: MEDICAID

## 2025-01-16 DIAGNOSIS — Z12.11 COLON CANCER SCREENING: Primary | ICD-10-CM

## 2025-01-29 ENCOUNTER — PATIENT MESSAGE (OUTPATIENT)
Dept: PRIMARY CARE CLINIC | Facility: CLINIC | Age: 60
End: 2025-01-29
Payer: MEDICAID

## 2025-03-06 ENCOUNTER — PATIENT MESSAGE (OUTPATIENT)
Dept: ADMINISTRATIVE | Facility: HOSPITAL | Age: 60
End: 2025-03-06
Payer: MEDICAID

## 2025-03-17 ENCOUNTER — PATIENT MESSAGE (OUTPATIENT)
Dept: ADMINISTRATIVE | Facility: HOSPITAL | Age: 60
End: 2025-03-17
Payer: MEDICAID

## 2025-04-07 ENCOUNTER — PATIENT MESSAGE (OUTPATIENT)
Dept: ADMINISTRATIVE | Facility: HOSPITAL | Age: 60
End: 2025-04-07
Payer: MEDICAID

## 2025-05-05 ENCOUNTER — PATIENT MESSAGE (OUTPATIENT)
Dept: ADMINISTRATIVE | Facility: HOSPITAL | Age: 60
End: 2025-05-05
Payer: MEDICAID

## 2025-06-02 ENCOUNTER — PATIENT MESSAGE (OUTPATIENT)
Dept: ADMINISTRATIVE | Facility: HOSPITAL | Age: 60
End: 2025-06-02
Payer: MEDICAID

## 2025-07-07 ENCOUNTER — PATIENT MESSAGE (OUTPATIENT)
Dept: ADMINISTRATIVE | Facility: HOSPITAL | Age: 60
End: 2025-07-07
Payer: MEDICAID

## 2025-08-13 ENCOUNTER — PATIENT MESSAGE (OUTPATIENT)
Dept: ADMINISTRATIVE | Facility: HOSPITAL | Age: 60
End: 2025-08-13
Payer: MEDICAID

## 2025-08-29 ENCOUNTER — PATIENT MESSAGE (OUTPATIENT)
Dept: ADMINISTRATIVE | Facility: HOSPITAL | Age: 60
End: 2025-08-29
Payer: MEDICAID